# Patient Record
Sex: FEMALE | Race: WHITE | NOT HISPANIC OR LATINO | Employment: FULL TIME | URBAN - METROPOLITAN AREA
[De-identification: names, ages, dates, MRNs, and addresses within clinical notes are randomized per-mention and may not be internally consistent; named-entity substitution may affect disease eponyms.]

---

## 2017-01-30 ENCOUNTER — TELEPHONE (OUTPATIENT)
Dept: OBSTETRICS AND GYNECOLOGY | Facility: CLINIC | Age: 42
End: 2017-01-30

## 2017-01-30 RX ORDER — LEVONORGESTREL AND ETHINYL ESTRADIOL 0.15-0.03
1 KIT ORAL DAILY
Qty: 91 TABLET | Refills: 0 | Status: SHIPPED | OUTPATIENT
Start: 2017-01-30 | End: 2017-02-16 | Stop reason: SDUPTHER

## 2017-01-30 NOTE — TELEPHONE ENCOUNTER
----- Message from Sakina Rushing sent at 1/30/2017  9:55 AM EST -----  Contact: Patient  RF Seasonale.     Patient has AE on 2/7/17

## 2017-01-31 ENCOUNTER — HOSPITAL ENCOUNTER (OUTPATIENT)
Dept: FAMILY MEDICINE CLINIC | Facility: CLINIC | Age: 42
Setting detail: SPECIMEN
Discharge: HOME | End: 2017-01-31
Attending: FAMILY MEDICINE | Admitting: FAMILY MEDICINE

## 2017-01-31 LAB
ANION GAP SERPL CALC-SCNC: 8.6 MMOL/L (ref 10–20)
BASOPHILS # BLD AUTO: 0.1 10*3/UL (ref 0–0.2)
BASOPHILS NFR BLD AUTO: 1 % (ref 0–2)
BUN SERPL-MCNC: 9 MG/DL (ref 8–20)
BUN/CREAT SERPL: 11.3 (ref 5.4–26.2)
CALCIUM SERPL-MCNC: 9.3 MG/DL (ref 8.9–10.3)
CHLORIDE SERPL-SCNC: 106 MMOL/L (ref 101–111)
CONV CO2: 26 MMOL/L (ref 22–32)
CREAT UR-MCNC: 0.8 MG/DL (ref 0.4–1)
DIFFERENTIAL METHOD BLD: (no result)
EOSINOPHIL # BLD AUTO: 0.2 10*3/UL (ref 0–0.3)
EOSINOPHIL # BLD AUTO: 3 % (ref 0–3)
ERYTHROCYTE [DISTWIDTH] IN BLOOD BY AUTOMATED COUNT: 13.2 % (ref 11.5–14.5)
GLUCOSE SERPL-MCNC: 89 MG/DL (ref 65–99)
HCT VFR BLD AUTO: 40.3 % (ref 35–49)
HGB BLD-MCNC: 13.8 G/DL (ref 12–15)
LYMPHOCYTES # BLD AUTO: 2.8 10*3/UL (ref 0.8–4.8)
LYMPHOCYTES NFR BLD AUTO: 35 % (ref 18–42)
MCH RBC QN AUTO: 29.8 PG (ref 26–32)
MCHC RBC AUTO-ENTMCNC: 34.3 G/DL (ref 32–36)
MCV RBC AUTO: 86.8 FL (ref 80–94)
MONOCYTES # BLD AUTO: 0.4 10*3/UL (ref 0.1–1.3)
MONOCYTES NFR BLD AUTO: 6 % (ref 2–11)
NEUTROPHILS # BLD AUTO: 4.5 10*3/UL (ref 2.3–8.6)
NEUTROPHILS NFR BLD AUTO: 55 % (ref 50–75)
NRBC BLD AUTO-RTO: 0 /100{WBCS}
NRBC/RBC NFR BLD MANUAL: 0 10*3/UL
PLATELET # BLD AUTO: 282 10*3/UL (ref 150–450)
PMV BLD AUTO: 8.9 FL (ref 7.4–10.4)
POTASSIUM SERPL-SCNC: 3.6 MMOL/L (ref 3.6–5.1)
RBC # BLD AUTO: 4.64 10*6/UL (ref 4–5.4)
SODIUM SERPL-SCNC: 137 MMOL/L (ref 136–144)
WBC # BLD AUTO: 8 10*3/UL (ref 4.5–11.5)

## 2017-02-16 ENCOUNTER — OFFICE VISIT (OUTPATIENT)
Dept: OBSTETRICS AND GYNECOLOGY | Facility: CLINIC | Age: 42
End: 2017-02-16

## 2017-02-16 VITALS
HEIGHT: 63 IN | DIASTOLIC BLOOD PRESSURE: 58 MMHG | SYSTOLIC BLOOD PRESSURE: 105 MMHG | WEIGHT: 119.6 LBS | BODY MASS INDEX: 21.19 KG/M2 | HEART RATE: 82 BPM

## 2017-02-16 DIAGNOSIS — Z12.4 ROUTINE CERVICAL SMEAR: Primary | ICD-10-CM

## 2017-02-16 DIAGNOSIS — Z30.41 ENCOUNTER FOR SURVEILLANCE OF CONTRACEPTIVE PILLS: ICD-10-CM

## 2017-02-16 DIAGNOSIS — Z01.419 WELL WOMAN EXAM WITH ROUTINE GYNECOLOGICAL EXAM: ICD-10-CM

## 2017-02-16 PROCEDURE — 99395 PREV VISIT EST AGE 18-39: CPT | Performed by: OBSTETRICS & GYNECOLOGY

## 2017-02-16 RX ORDER — DEXTROAMPHETAMINE SACCHARATE, AMPHETAMINE ASPARTATE, DEXTROAMPHETAMINE SULFATE AND AMPHETAMINE SULFATE 5; 5; 5; 5 MG/1; MG/1; MG/1; MG/1
TABLET ORAL
Refills: 0 | COMMUNITY
Start: 2017-02-09 | End: 2019-07-17 | Stop reason: SDUPTHER

## 2017-02-16 RX ORDER — LEVONORGESTREL AND ETHINYL ESTRADIOL 0.15-0.03
1 KIT ORAL DAILY
Qty: 91 TABLET | Refills: 3 | Status: SHIPPED | OUTPATIENT
Start: 2017-02-16 | End: 2017-05-18

## 2017-02-16 NOTE — PROGRESS NOTES
Subjective   Sakina Langley is a 41 y.o. female     CC: Annual and pap    Last annual 2016  Last pap 2013      History of Present Illness: notes no gyn concerns or problems.  Doing well on current OC and would like to continue.    The following portions of the patient's history were reviewed and updated as appropriate: allergies, current medications, past family history, past medical history, past social history, past surgical history and problem list.    Review of Systems   Constitutional: Negative for fatigue.   HENT: Negative for congestion.    Eyes: Negative for visual disturbance.   Respiratory: Negative for chest tightness and shortness of breath.    Cardiovascular: Negative for chest pain and palpitations.   Gastrointestinal: Negative for abdominal pain, blood in stool and nausea.   Endocrine: Negative for cold intolerance.   Genitourinary: Negative for difficulty urinating, dysuria, frequency, genital sores, hematuria, menstrual problem, pelvic pain, urgency, vaginal bleeding, vaginal discharge and vaginal pain.   Musculoskeletal: Negative for back pain.   Skin: Negative for color change and rash.   Neurological: Negative for headaches.   Psychiatric/Behavioral: Negative for sleep disturbance.       History reviewed. No pertinent past medical history.  Past Surgical History   Procedure Laterality Date   •  section     • Tonsillectomy     • Diagnostic laparoscopy     • Breast augmentation     • Mouth surgery       Pallet of mouth     OB History      Para Term  AB TAB SAB Ectopic Multiple Living    1 1        1        Menstrual History:  OB History      Para Term  AB TAB SAB Ectopic Multiple Living    1 1        1         Menarche age: 13  No LMP recorded. Patient is not currently having periods (Reason: Oral contraceptives).       Family History   Problem Relation Age of Onset   • Ovarian cancer Mother      History   Smoking Status   • Never Smoker    Smokeless Tobacco   • Not on file     History   Alcohol Use   • Yes       Objective   Physical Exam   Constitutional: She is oriented to person, place, and time. She appears well-developed and well-nourished.   HENT:   Head: Normocephalic and atraumatic.   Right Ear: External ear normal.   Left Ear: External ear normal.   Nose: Nose normal.   Eyes: EOM are normal. Pupils are equal, round, and reactive to light.   Neck: Normal range of motion. Neck supple. No thyromegaly present.   Cardiovascular: Normal rate, regular rhythm and normal heart sounds.    No murmur heard.  Pulmonary/Chest: Effort normal and breath sounds normal. She has no wheezes. She has no rales. She exhibits no tenderness. Right breast exhibits no inverted nipple, no mass, no nipple discharge, no skin change and no tenderness. Left breast exhibits no inverted nipple, no mass, no nipple discharge, no skin change and no tenderness. There is no breast swelling.   Abdominal: Soft. Bowel sounds are normal. She exhibits no distension and no mass. There is no tenderness. Hernia confirmed negative in the right inguinal area and confirmed negative in the left inguinal area.   Genitourinary: Uterus normal. No breast tenderness. Pelvic exam was performed with patient supine. There is no rash, tenderness or lesion on the right labia. There is no rash, tenderness or lesion on the left labia. Cervix exhibits no motion tenderness, no discharge and no friability. Right adnexum displays no mass and no tenderness. Left adnexum displays no mass and no tenderness. No erythema, tenderness or bleeding in the vagina. No vaginal discharge found.   Musculoskeletal: Normal range of motion. She exhibits no edema.   Neurological: She is alert and oriented to person, place, and time.   Skin: Skin is warm and dry. No rash noted.   Psychiatric: She has a normal mood and affect. Judgment normal.   Nursing note and vitals reviewed.        Assessment/Plan   Sakina was seen today  for annual exam.    Diagnoses and all orders for this visit:    Routine cervical smear  -     IGP, Aptima HPV, Rfx 16 / 18,45    Well woman exam with routine gynecological exam: normal gyn WWE    Encounter for surveillance of contraceptive pills    Other orders  -     levonorgestrel-ethinyl estradiol (SEASONALE) 0.15-0.03 MG per tablet; Take 1 tablet by mouth Daily for 91 days.        Cont. With annual WWE's, and SBE's.  Will see annually or prn.  Begin screening mammography next year.

## 2017-02-20 LAB
CYTOLOGIST CVX/VAG CYTO: NORMAL
CYTOLOGY CVX/VAG DOC THIN PREP: NORMAL
DX ICD CODE: NORMAL
HIV 1 & 2 AB SER-IMP: NORMAL
HPV I/H RISK 4 DNA CVX QL PROBE+SIG AMP: NEGATIVE
OTHER STN SPEC: NORMAL
PATH REPORT.FINAL DX SPEC: NORMAL
STAT OF ADQ CVX/VAG CYTO-IMP: NORMAL

## 2018-05-18 ENCOUNTER — TELEPHONE (OUTPATIENT)
Dept: OBSTETRICS AND GYNECOLOGY | Facility: CLINIC | Age: 43
End: 2018-05-18

## 2018-05-18 RX ORDER — LEVONORGESTREL AND ETHINYL ESTRADIOL 0.15-0.03
1 KIT ORAL DAILY
Qty: 28 TABLET | Refills: 1 | Status: SHIPPED | OUTPATIENT
Start: 2018-05-18 | End: 2018-06-28

## 2018-05-18 NOTE — TELEPHONE ENCOUNTER
----- Message from Sakina Rushing sent at 5/18/2018 11:18 AM EDT -----  Contact: Patient   Patient has an annual scheduled with you on 6/28/18.  Patient is wanting to know if you can refill her Seasonale until she comes in?

## 2018-06-28 ENCOUNTER — OFFICE VISIT (OUTPATIENT)
Dept: OBSTETRICS AND GYNECOLOGY | Facility: CLINIC | Age: 43
End: 2018-06-28

## 2018-06-28 VITALS
HEIGHT: 63 IN | HEART RATE: 84 BPM | WEIGHT: 120 LBS | SYSTOLIC BLOOD PRESSURE: 127 MMHG | DIASTOLIC BLOOD PRESSURE: 69 MMHG | BODY MASS INDEX: 21.26 KG/M2

## 2018-06-28 DIAGNOSIS — Z01.419 WELL WOMAN EXAM WITH ROUTINE GYNECOLOGICAL EXAM: Primary | ICD-10-CM

## 2018-06-28 PROCEDURE — 99396 PREV VISIT EST AGE 40-64: CPT | Performed by: OBSTETRICS & GYNECOLOGY

## 2018-06-28 NOTE — PROGRESS NOTES
Subjective   Sakina Langley is a 42 y.o. female   Chief Complaint   Patient presents with   • Annual Exam     last pap 17  HPV negative      History of Present Illness  Sakina Langley is a 42 y.o.  who presents for an annual examination.  Patient reports a lot of side effects with birth controls that or not Quasense or Seasonale.      Pap history:  Last pap: 2017 NIL, HPV negative  Prior abnormal paps: no  STDs  Sexually active: no  History of STDs: no  Contraception:  OCP    History of physical abuse: no, History of sexual abuse: no and History of verbal/emotional abuse: no    Screening for BRCA-   Is patient's family history significant for any of the following?   Yes, prior genetic testing was negative  For non-[employer]kenazi [employer] women:   Two first-degree relatives with breast cancer, one of whom was diagnosed at age 50 or younger   A combination of three or more first or second-degree relatives with breast cancer regardless of age at diagnosis   A combination of both breast and ovarian cancer among first and second-degree relatives   A first-degree relative with bilateral breast cancer   A combination of two or more first or second degree relatives with ovarian cancer, regardless of age at diagnosis   A first or second-degree relative with both breast and ovarian cancer at any age   History of breast cancer in a male relative   For women of Ashkenazi An[employer] descent:   Any first-degree relative (or two second degree relatives on the same side of the family) with breast or ovarian cancer   Recommendations from the [place] Preventive Services Task Force on who should be offered genetic testing for BRCA mutations*     History reviewed. No pertinent past medical history.  Past Surgical History:   Procedure Laterality Date   • BREAST AUGMENTATION     •  SECTION     • DIAGNOSTIC LAPAROSCOPY     • MOUTH SURGERY      Pallet of mouth   • TONSILLECTOMY       Social History   Substance Use Topics  "  • Smoking status: Never Smoker   • Smokeless tobacco: Never Used   • Alcohol use Yes      Comment: occa     Family History   Problem Relation Age of Onset   • Ovarian cancer Mother    • Breast cancer Neg Hx    • Uterine cancer Neg Hx    • Colon cancer Neg Hx      Current Outpatient Prescriptions on File Prior to Visit   Medication Sig Dispense Refill   • amphetamine-dextroamphetamine (ADDERALL) 20 MG tablet TK 1 TABLET PO BID  0   • [DISCONTINUED] levonorgestrel-ethinyl estradiol (SEASONALE) 0.15-0.03 MG per tablet Take 1 tablet by mouth Daily. 28 tablet 1     No current facility-administered medications on file prior to visit.      No Known Allergies       Review of Systems   Constitutional: Negative for chills, fever and unexpected weight change.   HENT: Negative for congestion, nosebleeds and sore throat.    Eyes: Negative for visual disturbance.   Respiratory: Negative for cough, chest tightness and shortness of breath.    Cardiovascular: Negative for chest pain and palpitations.   Gastrointestinal: Negative for abdominal pain, constipation, diarrhea, nausea and vomiting.   Endocrine: Negative for polyuria.   Genitourinary: Positive for menstrual problem (spotting and blames off brand OCP). Negative for difficulty urinating, dyspareunia, dysuria, frequency, genital sores, hematuria, pelvic pain, urgency, vaginal bleeding, vaginal discharge and vaginal pain.   Musculoskeletal: Negative for arthralgias and joint swelling.   Skin: Negative for color change and rash.   Neurological: Negative for dizziness, light-headedness and headaches.   Hematological: Does not bruise/bleed easily.   Psychiatric/Behavioral: Negative for dysphoric mood. The patient is not nervous/anxious.        Objective    /69   Pulse 84   Ht 160 cm (62.99\")   Wt 54.4 kg (120 lb)   BMI 21.26 kg/m²    Physical Exam   Constitutional: She is oriented to person, place, and time. She appears well-developed and well-nourished. No distress. "   HENT:   Head: Normocephalic and atraumatic.   Neck: No tracheal deviation present. No thyromegaly present.   Cardiovascular: Normal rate, regular rhythm and normal heart sounds.  Exam reveals no gallop and no friction rub.    No murmur heard.  Pulmonary/Chest: Effort normal and breath sounds normal. No respiratory distress. She has no wheezes. She has no rales. She exhibits no tenderness. Right breast exhibits no inverted nipple, no mass, no nipple discharge, no skin change and no tenderness. Left breast exhibits no inverted nipple, no mass, no nipple discharge, no skin change and no tenderness.   Abdominal: Soft. She exhibits no distension and no mass. There is no tenderness.   Genitourinary: Uterus normal. No labial fusion. There is no rash, lesion or injury on the right labia. There is no rash, lesion or injury on the left labia. Uterus is not deviated, not enlarged, not fixed and not tender. Cervix exhibits no motion tenderness, no discharge and no friability. Right adnexum displays no mass, no tenderness and no fullness. Left adnexum displays no mass, no tenderness and no fullness. No tenderness or bleeding in the vagina. No vaginal discharge found.   Musculoskeletal: Normal range of motion. She exhibits no edema or deformity.   Lymphadenopathy:     She has no cervical adenopathy.   Neurological: She is alert and oriented to person, place, and time.   Skin: Skin is warm and dry. No rash noted. She is not diaphoretic.   Psychiatric: She has a normal mood and affect. Her behavior is normal. Judgment and thought content normal.         Assessment/Plan   Problems Addressed this Visit     None      Visit Diagnoses     Well woman exam with routine gynecological exam    -  Primary        Well woman counseling/screening:    Cervical cancer screening:    Reports no h/o cervical dysplasia   The patient is due for a pap in 2022.    Screening guidelines discussed with patient  Breast cancer screening:    Clinical breast  exam recommended for age 20-39 years every 1-3 years   Mammogram recommended starting age 40, Discussed risks and benefits of earlier versus later screening. Patient plans to schedule   Breast self awareness encouraged  STD Screening   Declines  Contraception :   Reviewed that Seasonale was sent. Will call pharmacy and request only Quasense or brand seasonale  Family history   Has had negative genetic testing given maternal h/o ovarian cancer in mom and maternal aunts  Healthy lifestyle counseling:   Patient was counseled on    Body mass index is 21.26 kg/m².

## 2018-07-02 ENCOUNTER — TELEPHONE (OUTPATIENT)
Dept: OBSTETRICS AND GYNECOLOGY | Facility: CLINIC | Age: 43
End: 2018-07-02

## 2018-07-02 RX ORDER — LEVONORGESTREL AND ETHINYL ESTRADIOL 0.15-0.03
1 KIT ORAL DAILY
Qty: 84 TABLET | Refills: 3 | Status: SHIPPED | OUTPATIENT
Start: 2018-07-02 | End: 2019-07-02

## 2018-07-02 NOTE — TELEPHONE ENCOUNTER
Per patient's original discussion, she only will accept Quasense or name brand Seasonale.  I had originally ordered Seasonale and they had dispensed a different generic.  I then ordered Quasense.  She is now requesting Seasonale which I have ordered as SAMAN.  I have called the pharmacy to confirm.  They are working on this matter as 's have changed. Please call patient and update.

## 2018-07-02 NOTE — TELEPHONE ENCOUNTER
Pt states that her insurance will only cover her prescription if it is called or prescribed as Seasonale and not the off brand. Please advise.

## 2018-07-05 NOTE — TELEPHONE ENCOUNTER
7/5/18  Called patient to update regarding the Seasonale Dr. Chahal had call pharmacy to confirm that she had ordered Seaonale. No asnwer, left message to return call.       07/13/18  Called patient to update regarding the Seasonale Dr. Chahal had call pharmacy to confirm that she had ordered Seasonale. No asnwer, left message to return call.

## 2019-02-08 ENCOUNTER — HOSPITAL ENCOUNTER (OUTPATIENT)
Dept: FAMILY MEDICINE CLINIC | Facility: CLINIC | Age: 44
Setting detail: SPECIMEN
Discharge: HOME | End: 2019-02-08
Attending: FAMILY MEDICINE | Admitting: FAMILY MEDICINE

## 2019-02-08 LAB
ALBUMIN SERPL-MCNC: 3.7 G/DL (ref 3.5–4.8)
ALBUMIN/GLOB SERPL: 1.4 {RATIO} (ref 1–1.7)
ALP SERPL-CCNC: 45 IU/L (ref 32–91)
ALT SERPL-CCNC: 16 IU/L (ref 14–54)
AMPHETAMINES UR QL SCN: POSITIVE
ANION GAP SERPL CALC-SCNC: 12.4 MMOL/L (ref 10–20)
AST SERPL-CCNC: 20 IU/L (ref 15–41)
BARBITURATES UR QL SCN: NEGATIVE
BASOPHILS # BLD AUTO: 0.1 10*3/UL (ref 0–0.2)
BASOPHILS NFR BLD AUTO: 1 % (ref 0–2)
BENZODIAZ UR QL SCN: NEGATIVE
BILIRUB SERPL-MCNC: 0.4 MG/DL (ref 0.3–1.2)
BUN SERPL-MCNC: 11 MG/DL (ref 8–20)
BUN/CREAT SERPL: 13.8 (ref 5.4–26.2)
BZE UR QL SCN: NEGATIVE
CALCIUM SERPL-MCNC: 9.2 MG/DL (ref 8.9–10.3)
CHLORIDE SERPL-SCNC: 104 MMOL/L (ref 101–111)
CHOLEST SERPL-MCNC: 167 MG/DL
CHOLEST/HDLC SERPL: 2.9 {RATIO}
CONV CO2: 24 MMOL/L (ref 22–32)
CONV LDL CHOLESTEROL DIRECT: 100 MG/DL (ref 0–100)
CONV TOTAL PROTEIN: 6.3 G/DL (ref 6.1–7.9)
CREAT 24H UR-MCNC: ABNORMAL MG/DL
CREAT UR-MCNC: 0.8 MG/DL (ref 0.4–1)
DIFFERENTIAL METHOD BLD: (no result)
EOSINOPHIL # BLD AUTO: 0.1 10*3/UL (ref 0–0.3)
EOSINOPHIL # BLD AUTO: 1 % (ref 0–3)
ERYTHROCYTE [DISTWIDTH] IN BLOOD BY AUTOMATED COUNT: 13.9 % (ref 11.5–14.5)
GLOBULIN UR ELPH-MCNC: 2.6 G/DL (ref 2.5–3.8)
GLUCOSE SERPL-MCNC: 88 MG/DL (ref 65–99)
HCT VFR BLD AUTO: 41 % (ref 35–49)
HDLC SERPL-MCNC: 57 MG/DL
HGB BLD-MCNC: 13.7 G/DL (ref 12–15)
LDLC/HDLC SERPL: 1.8 {RATIO}
LIPID INTERPRETATION: NORMAL
LYMPHOCYTES # BLD AUTO: 1.9 10*3/UL (ref 0.8–4.8)
LYMPHOCYTES NFR BLD AUTO: 22 % (ref 18–42)
MCH RBC QN AUTO: 29.8 PG (ref 26–32)
MCHC RBC AUTO-ENTMCNC: 33.4 G/DL (ref 32–36)
MCV RBC AUTO: 89 FL (ref 80–94)
METHADONE UR QL SCN: NEGATIVE
MONOCYTES # BLD AUTO: 0.4 10*3/UL (ref 0.1–1.3)
MONOCYTES NFR BLD AUTO: 5 % (ref 2–11)
NEUTROPHILS # BLD AUTO: 6.3 10*3/UL (ref 2.3–8.6)
NEUTROPHILS NFR BLD AUTO: 71 % (ref 50–75)
NRBC BLD AUTO-RTO: 0 /100{WBCS}
NRBC/RBC NFR BLD MANUAL: 0 10*3/UL
OPIATES TESTED UR SCN: NEGATIVE
PCP UR QL: NEGATIVE
PLATELET # BLD AUTO: 292 10*3/UL (ref 150–450)
PMV BLD AUTO: 8.9 FL (ref 7.4–10.4)
POTASSIUM SERPL-SCNC: 3.4 MMOL/L (ref 3.6–5.1)
RBC # BLD AUTO: 4.61 10*6/UL (ref 4–5.4)
SODIUM SERPL-SCNC: 137 MMOL/L (ref 136–144)
THC SERPLBLD CFM-MCNC: NEGATIVE NG/ML
TRIGL SERPL-MCNC: 33 MG/DL
VLDLC SERPL CALC-MCNC: 9.6 MG/DL
WBC # BLD AUTO: 8.8 10*3/UL (ref 4.5–11.5)

## 2019-06-03 ENCOUNTER — CONVERSION ENCOUNTER (OUTPATIENT)
Dept: FAMILY MEDICINE CLINIC | Facility: CLINIC | Age: 44
End: 2019-06-03

## 2019-06-04 VITALS
BODY MASS INDEX: 20.49 KG/M2 | DIASTOLIC BLOOD PRESSURE: 79 MMHG | HEART RATE: 85 BPM | OXYGEN SATURATION: 98 % | SYSTOLIC BLOOD PRESSURE: 126 MMHG | HEIGHT: 64 IN | WEIGHT: 120 LBS

## 2019-06-06 NOTE — PROGRESS NOTES
Visit Type:  Follow-up Visit  Referring Provider:  Nehemias Pickens MD  Primary Provider:  Nehemias Pickens    CC:  f/u on ADHD.    History of Present Illness:         This is a 43 years old female who presents  for 3 months  f/u on ADHD and med refill.  She is doing well, taking Adderall - helping.  She  denies inattention, difficulity focusing,  anxiety,  depression and sleep disturbance.             Risk Factors:     Smoked Tobacco Use:  Never smoker      Review of Systems     General       Denies sleep disorder.    Psych       Denies anxiety, depression and inability to concentrate.      Vital Signs:    Patient Profile:    43 Years Old Female  Height:     64 inches  Weight:     120 pounds  BMI:        20.60     O2 Sat:     98 %  Temp:       98.2 degrees F oral  Pulse rate: 85 / minute  BP Sittin / 79  (right arm)    Cuff size:  regular      Problems: Active problems were reviewed with the patient during this visit.  Medications: Medications were reviewed with the patient during this visit.  Allergies: Allergies were reviewed with the patient during this visit.        Vitals Entered By: Shiela CADE (Barbra  3, 2019 2:33 PM)      Physical Exam    General:      well developed, well nourished, in no acute distress.    Neurologic:      no focal deficits, cranial nerves II-XII grossly intact with normal sensation, reflexes, coordination, muscle strength and tone.    Psych:      alert and cooperative; normal mood and affect; normal attention span and concentration.        Blood Pressure:  Today's BP: 126/79 mm Hg    Labwork:   Most Recent Lab Results:   LDL: 100 mg/dL 2019    Active Medications (reviewed today):  KLOR-CON 10 10 MEQ ORAL TABLET EXTENDED RELEASE (POTASSIUM CHLORIDE) Take 1 tablet by mouth daily for 1 week.  SEASONALE TABS (LEVONORGEST-ETH ESTRAD 91-DAY TABS) Take 1 tablet by mouth daily  ADDERALL 20 MG ORAL TABLET (AMPHETAMINE-DEXTROAMPHETAMINE) 1 po BID    Current Allergies (reviewed  today):  No known allergies        Impression & Recommendations:    Problem # 1:  ADHD (ICD-314.01) (WIK10-P17.9)  Assessment: Improved  Stable- refill  Adderall 20 mg bid.             Patient Instructions:  1)  Please schedule a follow-up appointment in 3 months.          Medications:  ADDERALL 20 MG ORAL TABLET (AMPHETAMINE-DEXTROAMPHETAMINE) 1 po BID  #60 x 0      Entered and Authorized by:  Nehemias Pickens MD      Electronically signed by:   Nehemias Pickens MD on 06/03/2019      Method used:    Handwritten      RxID:   [id number]                Medication Administration    Orders Added:  1)  Ofc Vst, Est Level II [[id number]]  ]      Electronically signed by Nehemias Pickens MD on 06/04/2019 at 8:18 AM  ________________________________________________________________________       Disclaimer: Converted Note message may not contain all data elements that existed in the legacy source system. Please see [facility] [facility] for the original note details.

## 2019-07-16 ENCOUNTER — TELEPHONE (OUTPATIENT)
Dept: FAMILY MEDICINE CLINIC | Facility: CLINIC | Age: 44
End: 2019-07-16

## 2019-07-17 RX ORDER — DEXTROAMPHETAMINE SACCHARATE, AMPHETAMINE ASPARTATE, DEXTROAMPHETAMINE SULFATE AND AMPHETAMINE SULFATE 5; 5; 5; 5 MG/1; MG/1; MG/1; MG/1
20 TABLET ORAL 2 TIMES DAILY
Qty: 60 TABLET | Refills: 0 | Status: SHIPPED | OUTPATIENT
Start: 2019-07-17 | End: 2019-08-20 | Stop reason: SDUPTHER

## 2019-08-20 RX ORDER — DEXTROAMPHETAMINE SACCHARATE, AMPHETAMINE ASPARTATE, DEXTROAMPHETAMINE SULFATE AND AMPHETAMINE SULFATE 5; 5; 5; 5 MG/1; MG/1; MG/1; MG/1
20 TABLET ORAL 2 TIMES DAILY
Qty: 60 TABLET | Refills: 0 | Status: SHIPPED | OUTPATIENT
Start: 2019-08-20 | End: 2019-09-26 | Stop reason: SDUPTHER

## 2019-09-26 ENCOUNTER — OFFICE VISIT (OUTPATIENT)
Dept: FAMILY MEDICINE CLINIC | Facility: CLINIC | Age: 44
End: 2019-09-26

## 2019-09-26 VITALS
TEMPERATURE: 97.7 F | BODY MASS INDEX: 21.62 KG/M2 | WEIGHT: 122 LBS | OXYGEN SATURATION: 100 % | SYSTOLIC BLOOD PRESSURE: 121 MMHG | HEART RATE: 90 BPM | RESPIRATION RATE: 16 BRPM | DIASTOLIC BLOOD PRESSURE: 78 MMHG | HEIGHT: 63 IN

## 2019-09-26 DIAGNOSIS — F41.9 ANXIETY: ICD-10-CM

## 2019-09-26 DIAGNOSIS — F90.9 ATTENTION DEFICIT HYPERACTIVITY DISORDER (ADHD), UNSPECIFIED ADHD TYPE: Primary | ICD-10-CM

## 2019-09-26 PROCEDURE — 99213 OFFICE O/P EST LOW 20 MIN: CPT | Performed by: FAMILY MEDICINE

## 2019-09-26 RX ORDER — DEXTROAMPHETAMINE SACCHARATE, AMPHETAMINE ASPARTATE, DEXTROAMPHETAMINE SULFATE AND AMPHETAMINE SULFATE 5; 5; 5; 5 MG/1; MG/1; MG/1; MG/1
20 TABLET ORAL 2 TIMES DAILY
Qty: 60 TABLET | Refills: 0 | Status: SHIPPED | OUTPATIENT
Start: 2019-09-26 | End: 2019-12-04 | Stop reason: SDUPTHER

## 2019-09-26 NOTE — ASSESSMENT & PLAN NOTE
Psychological condition is improving with treatment.  Continue current treatment regimen.  Regular aerobic exercise.  Psychological condition  will be reassessed in 3 months.

## 2019-09-26 NOTE — PROGRESS NOTES
Subjective   Sakina Langley is a 44 y.o. female.     History of Present Illness      This is a 44 years old female who presents  for 3 months  f/u on ADHD and med refill.  She is doing well, complain of anxiety and sleep disturbance for last 2 to 3 weeks since daughter has left home for college. She  denies inattention, difficulity focusing,  depression and suicidal thoughts.  She is taking Adderall which is helping.        The following portions of the patient's history were reviewed and updated as appropriate: past medical history, past social history, past surgical history and problem list.    Review of Systems   Constitutional: Negative for activity change, appetite change and fatigue.   HENT: Negative for congestion and trouble swallowing.    Respiratory: Negative for shortness of breath and wheezing.    Cardiovascular: Negative for chest pain and palpitations.   Neurological: Negative for dizziness and headache.   Psychiatric/Behavioral: Positive for sleep disturbance. Negative for suicidal ideas and depressed mood. The patient is nervous/anxious.        Objective   Physical Exam   Constitutional: She is oriented to person, place, and time. She appears well-developed.   HENT:   Head: Normocephalic.   Eyes: EOM are normal. Pupils are equal, round, and reactive to light.   Cardiovascular: Normal rate and normal heart sounds.   Pulmonary/Chest: Effort normal and breath sounds normal. She has no wheezes.   Abdominal: Soft. Bowel sounds are normal.   Musculoskeletal: Normal range of motion.   Neurological: She is alert and oriented to person, place, and time.   Psychiatric: She has a normal mood and affect. Her behavior is normal.   Vitals reviewed.        Assessment/Plan   Problems Addressed this Visit        Other    Attention deficit hyperactivity disorder (ADHD) - Primary     Psychological condition is improving with treatment.  Continue current treatment regimen.  Regular aerobic exercise.  Psychological  condition  will be reassessed in 3 months.         Relevant Medications    amphetamine-dextroamphetamine (ADDERALL) 20 MG tablet    Anxiety     New problem -discussed relaxation technique and behavioral modification.  Take over-the-counter melatonin for sleep disturbance.  Call us if symptoms no better or get worse.  The patient verified not suicidal at this time, she will call our office if there is  any worsening of Sx or thoughts of doing  harm arise.

## 2019-09-26 NOTE — ASSESSMENT & PLAN NOTE
New problem -discussed relaxation technique and behavioral modification.  Take over-the-counter melatonin for sleep disturbance.  Call us if symptoms no better or get worse.  The patient verified not suicidal at this time, she will call our office if there is  any worsening of Sx or thoughts of doing  harm arise.

## 2019-10-28 RX ORDER — DEXTROAMPHETAMINE SACCHARATE, AMPHETAMINE ASPARTATE, DEXTROAMPHETAMINE SULFATE AND AMPHETAMINE SULFATE 5; 5; 5; 5 MG/1; MG/1; MG/1; MG/1
TABLET ORAL EVERY 12 HOURS
COMMUNITY
Start: 2012-09-07 | End: 2019-10-28 | Stop reason: SDUPTHER

## 2019-10-28 RX ORDER — DEXTROAMPHETAMINE SACCHARATE, AMPHETAMINE ASPARTATE, DEXTROAMPHETAMINE SULFATE AND AMPHETAMINE SULFATE 5; 5; 5; 5 MG/1; MG/1; MG/1; MG/1
20 TABLET ORAL 2 TIMES DAILY
Qty: 60 TABLET | Refills: 0 | Status: SHIPPED | OUTPATIENT
Start: 2019-10-28 | End: 2019-12-04 | Stop reason: SDUPTHER

## 2019-12-04 ENCOUNTER — TELEPHONE (OUTPATIENT)
Dept: FAMILY MEDICINE CLINIC | Facility: CLINIC | Age: 44
End: 2019-12-04

## 2019-12-04 RX ORDER — DEXTROAMPHETAMINE SACCHARATE, AMPHETAMINE ASPARTATE, DEXTROAMPHETAMINE SULFATE AND AMPHETAMINE SULFATE 5; 5; 5; 5 MG/1; MG/1; MG/1; MG/1
20 TABLET ORAL 2 TIMES DAILY
Qty: 60 TABLET | Refills: 0 | Status: SHIPPED | OUTPATIENT
Start: 2019-12-04 | End: 2020-01-03 | Stop reason: SDUPTHER

## 2020-01-03 ENCOUNTER — OFFICE VISIT (OUTPATIENT)
Dept: FAMILY MEDICINE CLINIC | Facility: CLINIC | Age: 45
End: 2020-01-03

## 2020-01-03 VITALS
SYSTOLIC BLOOD PRESSURE: 110 MMHG | TEMPERATURE: 98 F | HEIGHT: 63 IN | OXYGEN SATURATION: 100 % | BODY MASS INDEX: 21.44 KG/M2 | WEIGHT: 121 LBS | HEART RATE: 72 BPM | DIASTOLIC BLOOD PRESSURE: 70 MMHG

## 2020-01-03 DIAGNOSIS — F41.9 ANXIETY: ICD-10-CM

## 2020-01-03 DIAGNOSIS — F90.9 ATTENTION DEFICIT HYPERACTIVITY DISORDER (ADHD), UNSPECIFIED ADHD TYPE: Primary | ICD-10-CM

## 2020-01-03 PROCEDURE — 99213 OFFICE O/P EST LOW 20 MIN: CPT | Performed by: FAMILY MEDICINE

## 2020-01-03 RX ORDER — DEXTROAMPHETAMINE SACCHARATE, AMPHETAMINE ASPARTATE, DEXTROAMPHETAMINE SULFATE AND AMPHETAMINE SULFATE 5; 5; 5; 5 MG/1; MG/1; MG/1; MG/1
20 TABLET ORAL 2 TIMES DAILY
Qty: 60 TABLET | Refills: 0 | Status: SHIPPED | OUTPATIENT
Start: 2020-01-03 | End: 2020-02-12 | Stop reason: SDUPTHER

## 2020-01-03 NOTE — PROGRESS NOTES
Subjective   Sakina Langley is a 44 y.o. female.     History of Present Illness     The patient present  for 3 months f/u on ADD, anxiety  and med refill. She is doing well, tolerating adderall well , denies difficulity concentration, focusing, anxiety, depression, and  insomnia.        The following portions of the patient's history were reviewed and updated as appropriate: past medical history, past social history, past surgical history and problem list.    Review of Systems   Constitutional: Negative for fatigue.   Cardiovascular: Negative for chest pain and palpitations.   Gastrointestinal: Negative for abdominal pain.   Endocrine: Negative for cold intolerance.   Neurological: Negative for dizziness and headache.   Psychiatric/Behavioral: Negative for sleep disturbance, suicidal ideas and depressed mood. The patient is not nervous/anxious.        Objective   Physical Exam   Constitutional: She is oriented to person, place, and time. She appears well-developed.   Cardiovascular: Normal rate, regular rhythm and normal heart sounds.   Pulmonary/Chest: Effort normal and breath sounds normal. She has no wheezes.   Abdominal: Soft. Bowel sounds are normal.   Musculoskeletal: Normal range of motion.   Neurological: She is alert and oriented to person, place, and time.   Psychiatric: She has a normal mood and affect. Her behavior is normal.   Vitals reviewed.        Assessment/Plan   Problems Addressed this Visit        Other    Attention deficit hyperactivity disorder (ADHD) - Primary     Psychological condition is improving with treatment.  Continue current treatment regimen.  Regular aerobic exercise.  Psychological condition  will be reassessed in 3 months.         Relevant Medications    amphetamine-dextroamphetamine (ADDERALL) 20 MG tablet    Anxiety     Anxiety is improving.

## 2020-01-14 ENCOUNTER — TELEPHONE (OUTPATIENT)
Dept: OBSTETRICS AND GYNECOLOGY | Facility: CLINIC | Age: 45
End: 2020-01-14

## 2020-01-14 NOTE — TELEPHONE ENCOUNTER
Patient is needing levonorgestrel-ethinyl estradiol (SEASONALE) 0.15-0.03 MG per tablet called in, states she needs two months called in to get her to her annual appointment scheduled in May.

## 2020-02-12 DIAGNOSIS — F90.9 ATTENTION DEFICIT HYPERACTIVITY DISORDER (ADHD), UNSPECIFIED ADHD TYPE: ICD-10-CM

## 2020-02-12 RX ORDER — DEXTROAMPHETAMINE SACCHARATE, AMPHETAMINE ASPARTATE, DEXTROAMPHETAMINE SULFATE AND AMPHETAMINE SULFATE 5; 5; 5; 5 MG/1; MG/1; MG/1; MG/1
20 TABLET ORAL 2 TIMES DAILY
Qty: 60 TABLET | Refills: 0 | Status: SHIPPED | OUTPATIENT
Start: 2020-02-12 | End: 2020-04-14 | Stop reason: SDUPTHER

## 2020-02-27 ENCOUNTER — OFFICE VISIT (OUTPATIENT)
Dept: FAMILY MEDICINE CLINIC | Facility: CLINIC | Age: 45
End: 2020-02-27

## 2020-02-27 ENCOUNTER — LAB (OUTPATIENT)
Dept: FAMILY MEDICINE CLINIC | Facility: CLINIC | Age: 45
End: 2020-02-27

## 2020-02-27 VITALS
SYSTOLIC BLOOD PRESSURE: 118 MMHG | OXYGEN SATURATION: 100 % | WEIGHT: 121 LBS | TEMPERATURE: 98.2 F | BODY MASS INDEX: 21.44 KG/M2 | DIASTOLIC BLOOD PRESSURE: 73 MMHG | HEART RATE: 90 BPM

## 2020-02-27 DIAGNOSIS — R10.13 EPIGASTRIC PAIN: ICD-10-CM

## 2020-02-27 DIAGNOSIS — R10.13 EPIGASTRIC PAIN: Primary | ICD-10-CM

## 2020-02-27 DIAGNOSIS — R10.11 RUQ ABDOMINAL PAIN: ICD-10-CM

## 2020-02-27 LAB
ALBUMIN SERPL-MCNC: 4.5 G/DL (ref 3.5–5.2)
ALBUMIN/GLOB SERPL: 2 G/DL
ALP SERPL-CCNC: 44 U/L (ref 39–117)
ALT SERPL W P-5'-P-CCNC: 9 U/L (ref 1–33)
ANION GAP SERPL CALCULATED.3IONS-SCNC: 10.5 MMOL/L (ref 5–15)
AST SERPL-CCNC: 15 U/L (ref 1–32)
BASOPHILS # BLD AUTO: 0.05 10*3/MM3 (ref 0–0.2)
BASOPHILS NFR BLD AUTO: 0.6 % (ref 0–1.5)
BILIRUB SERPL-MCNC: 0.2 MG/DL (ref 0.2–1.2)
BUN BLD-MCNC: 10 MG/DL (ref 6–20)
BUN/CREAT SERPL: 11.8 (ref 7–25)
CALCIUM SPEC-SCNC: 9.4 MG/DL (ref 8.6–10.5)
CHLORIDE SERPL-SCNC: 103 MMOL/L (ref 98–107)
CO2 SERPL-SCNC: 24.5 MMOL/L (ref 22–29)
CREAT BLD-MCNC: 0.85 MG/DL (ref 0.57–1)
DEPRECATED RDW RBC AUTO: 39.2 FL (ref 37–54)
EOSINOPHIL # BLD AUTO: 0.25 10*3/MM3 (ref 0–0.4)
EOSINOPHIL NFR BLD AUTO: 3 % (ref 0.3–6.2)
ERYTHROCYTE [DISTWIDTH] IN BLOOD BY AUTOMATED COUNT: 12.4 % (ref 12.3–15.4)
GFR SERPL CREATININE-BSD FRML MDRD: 73 ML/MIN/1.73
GLOBULIN UR ELPH-MCNC: 2.3 GM/DL
GLUCOSE BLD-MCNC: 96 MG/DL (ref 65–99)
HCT VFR BLD AUTO: 39.1 % (ref 34–46.6)
HGB BLD-MCNC: 13.5 G/DL (ref 12–15.9)
IMM GRANULOCYTES # BLD AUTO: 0.02 10*3/MM3 (ref 0–0.05)
IMM GRANULOCYTES NFR BLD AUTO: 0.2 % (ref 0–0.5)
LYMPHOCYTES # BLD AUTO: 3.04 10*3/MM3 (ref 0.7–3.1)
LYMPHOCYTES NFR BLD AUTO: 37 % (ref 19.6–45.3)
MCH RBC QN AUTO: 30.2 PG (ref 26.6–33)
MCHC RBC AUTO-ENTMCNC: 34.5 G/DL (ref 31.5–35.7)
MCV RBC AUTO: 87.5 FL (ref 79–97)
MONOCYTES # BLD AUTO: 0.62 10*3/MM3 (ref 0.1–0.9)
MONOCYTES NFR BLD AUTO: 7.6 % (ref 5–12)
NEUTROPHILS # BLD AUTO: 4.23 10*3/MM3 (ref 1.7–7)
NEUTROPHILS NFR BLD AUTO: 51.6 % (ref 42.7–76)
NRBC BLD AUTO-RTO: 0 /100 WBC (ref 0–0.2)
PLATELET # BLD AUTO: 312 10*3/MM3 (ref 140–450)
PMV BLD AUTO: 10.1 FL (ref 6–12)
POTASSIUM BLD-SCNC: 3.8 MMOL/L (ref 3.5–5.2)
PROT SERPL-MCNC: 6.8 G/DL (ref 6–8.5)
RBC # BLD AUTO: 4.47 10*6/MM3 (ref 3.77–5.28)
SODIUM BLD-SCNC: 138 MMOL/L (ref 136–145)
WBC NRBC COR # BLD: 8.21 10*3/MM3 (ref 3.4–10.8)

## 2020-02-27 PROCEDURE — 85025 COMPLETE CBC W/AUTO DIFF WBC: CPT | Performed by: FAMILY MEDICINE

## 2020-02-27 PROCEDURE — 36415 COLL VENOUS BLD VENIPUNCTURE: CPT

## 2020-02-27 PROCEDURE — 80053 COMPREHEN METABOLIC PANEL: CPT | Performed by: FAMILY MEDICINE

## 2020-02-27 PROCEDURE — 99214 OFFICE O/P EST MOD 30 MIN: CPT | Performed by: FAMILY MEDICINE

## 2020-02-27 RX ORDER — NICOTINE POLACRILEX 4 MG/1
20 GUM, CHEWING ORAL DAILY
Qty: 30 EACH | Refills: 3 | Status: SHIPPED | OUTPATIENT
Start: 2020-02-27 | End: 2020-09-16

## 2020-02-27 NOTE — PROGRESS NOTES
Subjective   Sakina Langley is a 44 y.o. female.     Abdominal Pain   This is a new problem. The current episode started 1 to 4 weeks ago. The onset quality is gradual. The problem occurs daily. The problem has been gradually worsening. The pain is located in the epigastric region and RUQ. The pain is at a severity of 9/10. The pain is moderate. The quality of the pain is dull and sharp. The abdominal pain does not radiate. Associated symptoms include diarrhea, flatus and nausea. Pertinent negatives include no constipation, dysuria, fever, frequency, headaches, hematuria, melena, vomiting or weight loss. The pain is aggravated by eating. She has tried nothing for the symptoms.        The following portions of the patient's history were reviewed and updated as appropriate: past medical history, past social history, past surgical history and problem list.    Review of Systems   Constitutional: Positive for fatigue. Negative for fever and unexpected weight loss.   HENT: Negative for trouble swallowing.    Eyes: Negative for visual disturbance.   Gastrointestinal: Positive for abdominal distention, abdominal pain, diarrhea, flatus, nausea and indigestion. Negative for blood in stool, constipation, melena and vomiting.   Genitourinary: Negative for dysuria, frequency, hematuria, pelvic pain and urgency.   Musculoskeletal: Negative for back pain.   Neurological: Negative for dizziness and headache.       Objective   Physical Exam   Constitutional: She is oriented to person, place, and time. She appears well-developed.   HENT:   Mouth/Throat: Oropharynx is clear and moist.   Eyes: Pupils are equal, round, and reactive to light. EOM are normal.   Neck: Normal range of motion. Neck supple.   Cardiovascular: Normal rate, regular rhythm and normal heart sounds.   Pulmonary/Chest: Effort normal and breath sounds normal. She has no wheezes.   Abdominal: Soft. Bowel sounds are normal. There is tenderness. There is no rebound and  no guarding.   +  epigastric and right upper quadrant tenderness   Musculoskeletal: Normal range of motion.   Neurological: She is alert and oriented to person, place, and time.   Psychiatric: She has a normal mood and affect.   Vitals reviewed.        Assessment/Plan   Problems Addressed this Visit        Nervous and Auditory    Epigastric pain - Primary     New problem- Discussed symptom management and fluids.  Rx omeprazole.  We will check CBC and CMP               Relevant Orders    Comprehensive metabolic panel (Completed)    CBC w AUTO Differential (Completed)    RUQ abdominal pain     We will schedule for gallbladder ultrasound.         Relevant Orders    US Gallbladder

## 2020-02-28 NOTE — ASSESSMENT & PLAN NOTE
New problem- Discussed symptom management and fluids.  Rx omeprazole.  We will check CBC and CMP

## 2020-03-05 ENCOUNTER — HOSPITAL ENCOUNTER (OUTPATIENT)
Dept: ULTRASOUND IMAGING | Facility: HOSPITAL | Age: 45
Discharge: HOME | End: 2020-03-05
Admitting: FAMILY MEDICINE

## 2020-03-05 DIAGNOSIS — R10.11 RUQ ABDOMINAL PAIN: ICD-10-CM

## 2020-03-05 PROCEDURE — 76705 ECHO EXAM OF ABDOMEN: CPT

## 2020-03-06 ENCOUNTER — TELEPHONE (OUTPATIENT)
Dept: FAMILY MEDICINE CLINIC | Facility: CLINIC | Age: 45
End: 2020-03-06

## 2020-03-06 DIAGNOSIS — K80.20 GALL STONES: Primary | ICD-10-CM

## 2020-03-11 ENCOUNTER — APPOINTMENT (OUTPATIENT)
Dept: PREADMISSION TESTING | Facility: HOSPITAL | Age: 45
End: 2020-03-11

## 2020-03-11 ENCOUNTER — OFFICE VISIT (OUTPATIENT)
Dept: SURGERY | Facility: CLINIC | Age: 45
End: 2020-03-11

## 2020-03-11 ENCOUNTER — PREP FOR SURGERY (OUTPATIENT)
Dept: OTHER | Facility: HOSPITAL | Age: 45
End: 2020-03-11

## 2020-03-11 VITALS
OXYGEN SATURATION: 100 % | HEART RATE: 84 BPM | SYSTOLIC BLOOD PRESSURE: 115 MMHG | HEIGHT: 63 IN | BODY MASS INDEX: 21.05 KG/M2 | DIASTOLIC BLOOD PRESSURE: 73 MMHG | WEIGHT: 118.8 LBS

## 2020-03-11 VITALS
OXYGEN SATURATION: 100 % | HEART RATE: 64 BPM | BODY MASS INDEX: 21.09 KG/M2 | SYSTOLIC BLOOD PRESSURE: 125 MMHG | WEIGHT: 119 LBS | HEIGHT: 63 IN | RESPIRATION RATE: 14 BRPM | DIASTOLIC BLOOD PRESSURE: 74 MMHG

## 2020-03-11 DIAGNOSIS — K81.9 CHOLECYSTITIS: Primary | ICD-10-CM

## 2020-03-11 LAB
DEPRECATED RDW RBC AUTO: 40.7 FL (ref 37–54)
ERYTHROCYTE [DISTWIDTH] IN BLOOD BY AUTOMATED COUNT: 13.2 % (ref 12.3–15.4)
HCT VFR BLD AUTO: 39.9 % (ref 34–46.6)
HGB BLD-MCNC: 13.6 G/DL (ref 12–15.9)
MCH RBC QN AUTO: 29.9 PG (ref 26.6–33)
MCHC RBC AUTO-ENTMCNC: 34.1 G/DL (ref 31.5–35.7)
MCV RBC AUTO: 87.8 FL (ref 79–97)
PLATELET # BLD AUTO: 296 10*3/MM3 (ref 140–450)
PMV BLD AUTO: 7.9 FL (ref 6–12)
RBC # BLD AUTO: 4.54 10*6/MM3 (ref 3.77–5.28)
WBC NRBC COR # BLD: 11.2 10*3/MM3 (ref 3.4–10.8)

## 2020-03-11 PROCEDURE — 85027 COMPLETE CBC AUTOMATED: CPT | Performed by: SURGERY

## 2020-03-11 PROCEDURE — 99203 OFFICE O/P NEW LOW 30 MIN: CPT | Performed by: SURGERY

## 2020-03-11 PROCEDURE — 36415 COLL VENOUS BLD VENIPUNCTURE: CPT

## 2020-03-11 NOTE — H&P (VIEW-ONLY)
Subjective   Sakina Langley is a 44 y.o. female.   No chief complaint on file.    There were no vitals taken for this visit.    HISTORY OF PRESENT ILLNESS:  Patient is a pleasant 44-year-old female presenting for evaluation of cholelithiasis.  She complains of a 2-month history of epigastric pain, diarrhea, bloating.  Her symptoms are worse after eating and have been gradually worsening over time.  She classifies the most severe at this time.  She has been modifying her diet and is eating only yogurt and applesauce at this time.    Answers for HPI/ROS submitted by the patient on 3/11/2020   Abdominal pain  What is the primary reason for your visit?: Abdominal Pain  Chronicity: new  Onset: more than 1 month ago  Onset quality: gradual  Frequency: constantly  Episode duration: 1 hours  Progression since onset: rapidly worsening  Pain location: epigastric region  Pain - numeric: 10/10  Pain quality: sharp  Radiates to: epigastric region, back  anorexia: Yes  belching: Yes  flatus: Yes  headaches: Yes  hematochezia: No  melena: No  Aggravated by: eating  Relieved by: nothing  Diagnostic workup: ultrasound      The following portions of the patient's history were reviewed and updated as appropriate: allergies, current medications, past family history, past medical history, past social history, past surgical history and problem list.    Review of Systems   Constitutional: Positive for appetite change. Negative for fever and unexpected weight loss.   HENT: Negative for tinnitus.    Eyes: Negative for blurred vision.   Respiratory: Negative for shortness of breath.    Cardiovascular: Negative for chest pain.   Gastrointestinal: Positive for abdominal pain, constipation, diarrhea, nausea and indigestion. Negative for vomiting.        Bloating, bowel changes, gas   Genitourinary: Negative for dysuria, frequency and hematuria.   Musculoskeletal: Negative for arthralgias and myalgias.   Skin: Negative for rash.   Hematological:  Does not bruise/bleed easily.     I have reviewed the above ROS and updated as appropriate.    Objective   Physical Exam   Constitutional: She appears well-developed and well-nourished. No distress.   HENT:   Head: Normocephalic and atraumatic.   Eyes: EOM are normal.   Neck: Normal range of motion.   Cardiovascular: Normal rate.   Pulmonary/Chest: Effort normal. No respiratory distress.   Abdominal:   Periumbilical incisional scar noted   Musculoskeletal: Normal range of motion.   Neurological: She is alert. No cranial nerve deficit.   Psychiatric: She has a normal mood and affect. Her behavior is normal.   Vitals reviewed.        Assessment/Plan   There are no diagnoses linked to this encounter.Reviewed gallbladder ultrasound performed on 3/6/2020 consistent with a 2.7 cm mobile gallstone.    Recommend laparoscopic cholecystectomy.  Discussed possibility of conversion to the open procedure.  Discussed risks associated with procedure, including general anesthesia, bleeding, infection, and injury to surrounding structures.  Discussed likely feeling of bloating postoperatively, as well as possibility of loose stools postoperatively.    Recommend bland diet until surgery.  Patient understands and agrees to plan.    Ermelinda Gerardo MD, 3/11/2020 3:10 PM, acting as scribe for Dr. Gerardo.    I, Ermelinda Gerardo MD, personally performed the services described in this documentation as scribed by the above named individual in my presence, and it is both accurate and complete.  3/11/2020  15:32

## 2020-03-11 NOTE — PROGRESS NOTES
"Subjective   Sakina Langley is a 44 y.o. female.   Chief Complaint   Patient presents with   • Abdominal Pain     gallbladder     /74 (BP Location: Left arm, Patient Position: Sitting, Cuff Size: Adult)   Pulse 64   Resp 14   Ht 160 cm (63\")   Wt 54 kg (119 lb)   SpO2 100%   BMI 21.08 kg/m²     HISTORY OF PRESENT ILLNESS:  Patient is a pleasant 44-year-old female presenting for evaluation of cholelithiasis.  She complains of a 2-month history of epigastric pain, diarrhea, bloating.  Her symptoms are worse after eating and have been gradually worsening over time.  She classifies the most severe at this time.  She has been modifying her diet and is eating only yogurt and applesauce at this time.    Answers for HPI/ROS submitted by the patient on 3/11/2020   Abdominal pain  What is the primary reason for your visit?: Abdominal Pain  Chronicity: new  Onset: more than 1 month ago  Onset quality: gradual  Frequency: constantly  Episode duration: 1 hours  Progression since onset: rapidly worsening  Pain location: epigastric region  Pain - numeric: 10/10  Pain quality: sharp  Radiates to: epigastric region, back  anorexia: Yes  belching: Yes  flatus: Yes  headaches: Yes  hematochezia: No  melena: No  Aggravated by: eating  Relieved by: nothing  Diagnostic workup: ultrasound      The following portions of the patient's history were reviewed and updated as appropriate: allergies, current medications, past family history, past medical history, past social history, past surgical history and problem list.    Review of Systems   Constitutional: Positive for appetite change. Negative for fever and unexpected weight loss.   HENT: Negative for tinnitus.    Eyes: Negative for blurred vision.   Respiratory: Negative for shortness of breath.    Cardiovascular: Negative for chest pain.   Gastrointestinal: Positive for abdominal pain, constipation, diarrhea, nausea and indigestion. Negative for vomiting.        Bloating, " bowel changes, gas   Genitourinary: Negative for dysuria, frequency and hematuria.   Musculoskeletal: Negative for arthralgias and myalgias.   Skin: Negative for rash.   Hematological: Does not bruise/bleed easily.     I have reviewed the above ROS and updated as appropriate.    Objective   Physical Exam   Constitutional: She appears well-developed and well-nourished. No distress.   HENT:   Head: Normocephalic and atraumatic.   Eyes: EOM are normal.   Neck: Normal range of motion.   Cardiovascular: Normal rate.   Pulmonary/Chest: Effort normal. No respiratory distress.   Abdominal:   Periumbilical incisional scar noted   Musculoskeletal: Normal range of motion.   Neurological: She is alert. No cranial nerve deficit.   Psychiatric: She has a normal mood and affect. Her behavior is normal.   Vitals reviewed.        Assessment/Plan   Sakina was seen today for abdominal pain.    Diagnoses and all orders for this visit:    Cholecystitis    Reviewed gallbladder ultrasound performed on 3/6/2020 consistent with a 2.7 cm mobile gallstone.    Recommend laparoscopic cholecystectomy.  Discussed possibility of conversion to the open procedure.  Discussed risks associated with procedure, including general anesthesia, bleeding, infection, and injury to surrounding structures.  Discussed likely feeling of bloating postoperatively, as well as possibility of loose stools postoperatively.    Recommend bland diet until surgery.  Patient understands and agrees to plan.    Sakina Medley PA-C, 3/11/2020 3:10 PM, acting as scribe for Dr. Gerardo.    I, Ermelinda Gerardo MD, personally performed the services described in this documentation as scribed by the above named individual in my presence, and it is both accurate and complete.  3/11/2020  15:31

## 2020-03-16 ENCOUNTER — ANESTHESIA EVENT (OUTPATIENT)
Dept: PERIOP | Facility: HOSPITAL | Age: 45
End: 2020-03-16

## 2020-03-17 ENCOUNTER — ANESTHESIA (OUTPATIENT)
Dept: PERIOP | Facility: HOSPITAL | Age: 45
End: 2020-03-17

## 2020-03-17 ENCOUNTER — HOSPITAL ENCOUNTER (OUTPATIENT)
Facility: HOSPITAL | Age: 45
Setting detail: HOSPITAL OUTPATIENT SURGERY
Discharge: HOME | End: 2020-03-17
Attending: SURGERY | Admitting: SURGERY

## 2020-03-17 VITALS
TEMPERATURE: 97.7 F | WEIGHT: 118.61 LBS | SYSTOLIC BLOOD PRESSURE: 95 MMHG | DIASTOLIC BLOOD PRESSURE: 52 MMHG | RESPIRATION RATE: 14 BRPM | HEIGHT: 63 IN | HEART RATE: 82 BPM | BODY MASS INDEX: 21.02 KG/M2 | OXYGEN SATURATION: 97 %

## 2020-03-17 DIAGNOSIS — K81.9 CHOLECYSTITIS: ICD-10-CM

## 2020-03-17 LAB — B-HCG UR QL: NEGATIVE

## 2020-03-17 PROCEDURE — 88304 TISSUE EXAM BY PATHOLOGIST: CPT | Performed by: SURGERY

## 2020-03-17 PROCEDURE — 47562 LAPAROSCOPIC CHOLECYSTECTOMY: CPT | Performed by: PHYSICIAN ASSISTANT

## 2020-03-17 PROCEDURE — 25010000002 PROPOFOL 200 MG/20ML EMULSION: Performed by: ANESTHESIOLOGY

## 2020-03-17 PROCEDURE — 25010000002 FENTANYL CITRATE (PF) 250 MCG/5ML SOLUTION: Performed by: ANESTHESIOLOGY

## 2020-03-17 PROCEDURE — 25010000002 KETOROLAC TROMETHAMINE PER 15 MG: Performed by: ANESTHESIOLOGY

## 2020-03-17 PROCEDURE — 25010000002 DEXAMETHASONE PER 1 MG: Performed by: ANESTHESIOLOGY

## 2020-03-17 PROCEDURE — 81025 URINE PREGNANCY TEST: CPT | Performed by: SURGERY

## 2020-03-17 PROCEDURE — 47562 LAPAROSCOPIC CHOLECYSTECTOMY: CPT | Performed by: SURGERY

## 2020-03-17 PROCEDURE — 93005 ELECTROCARDIOGRAM TRACING: CPT | Performed by: SURGERY

## 2020-03-17 PROCEDURE — 25010000002 ONDANSETRON PER 1 MG: Performed by: ANESTHESIOLOGY

## 2020-03-17 PROCEDURE — 25010000002 HYDROMORPHONE PER 4 MG: Performed by: ANESTHESIOLOGY

## 2020-03-17 PROCEDURE — 25010000002 MIDAZOLAM PER 1 MG: Performed by: ANESTHESIOLOGY

## 2020-03-17 RX ORDER — SODIUM CHLORIDE 9 MG/ML
INJECTION, SOLUTION INTRAVENOUS AS NEEDED
Status: DISCONTINUED | OUTPATIENT
Start: 2020-03-17 | End: 2020-03-17 | Stop reason: HOSPADM

## 2020-03-17 RX ORDER — KETOROLAC TROMETHAMINE 30 MG/ML
INJECTION, SOLUTION INTRAMUSCULAR; INTRAVENOUS AS NEEDED
Status: DISCONTINUED | OUTPATIENT
Start: 2020-03-17 | End: 2020-03-17 | Stop reason: SURG

## 2020-03-17 RX ORDER — MIDAZOLAM HYDROCHLORIDE 1 MG/ML
INJECTION INTRAMUSCULAR; INTRAVENOUS AS NEEDED
Status: DISCONTINUED | OUTPATIENT
Start: 2020-03-17 | End: 2020-03-17 | Stop reason: SURG

## 2020-03-17 RX ORDER — NEOSTIGMINE METHYLSULFATE 5 MG/5 ML
SYRINGE (ML) INTRAVENOUS AS NEEDED
Status: DISCONTINUED | OUTPATIENT
Start: 2020-03-17 | End: 2020-03-17 | Stop reason: SURG

## 2020-03-17 RX ORDER — SCOLOPAMINE TRANSDERMAL SYSTEM 1 MG/1
PATCH, EXTENDED RELEASE TRANSDERMAL AS NEEDED
Status: DISCONTINUED | OUTPATIENT
Start: 2020-03-17 | End: 2020-03-17 | Stop reason: SURG

## 2020-03-17 RX ORDER — LIDOCAINE HYDROCHLORIDE 20 MG/ML
INJECTION, SOLUTION EPIDURAL; INFILTRATION; INTRACAUDAL; PERINEURAL AS NEEDED
Status: DISCONTINUED | OUTPATIENT
Start: 2020-03-17 | End: 2020-03-17 | Stop reason: SURG

## 2020-03-17 RX ORDER — DEXAMETHASONE SODIUM PHOSPHATE 4 MG/ML
INJECTION, SOLUTION INTRA-ARTICULAR; INTRALESIONAL; INTRAMUSCULAR; INTRAVENOUS; SOFT TISSUE AS NEEDED
Status: DISCONTINUED | OUTPATIENT
Start: 2020-03-17 | End: 2020-03-17 | Stop reason: SURG

## 2020-03-17 RX ORDER — OXYCODONE HYDROCHLORIDE AND ACETAMINOPHEN 5; 325 MG/1; MG/1
1 TABLET ORAL EVERY 6 HOURS PRN
Qty: 30 TABLET | Refills: 0 | Status: SHIPPED | OUTPATIENT
Start: 2020-03-17 | End: 2020-09-16

## 2020-03-17 RX ORDER — SODIUM CHLORIDE 0.9 % (FLUSH) 0.9 %
10 SYRINGE (ML) INJECTION EVERY 12 HOURS SCHEDULED
Status: DISCONTINUED | OUTPATIENT
Start: 2020-03-17 | End: 2020-03-17 | Stop reason: HOSPADM

## 2020-03-17 RX ORDER — GLYCOPYRROLATE 0.2 MG/ML
INJECTION INTRAMUSCULAR; INTRAVENOUS AS NEEDED
Status: DISCONTINUED | OUTPATIENT
Start: 2020-03-17 | End: 2020-03-17 | Stop reason: SURG

## 2020-03-17 RX ORDER — HYDROCODONE BITARTRATE AND ACETAMINOPHEN 7.5; 325 MG/1; MG/1
1 TABLET ORAL ONCE
Status: COMPLETED | OUTPATIENT
Start: 2020-03-17 | End: 2020-03-17

## 2020-03-17 RX ORDER — ONDANSETRON 2 MG/ML
INJECTION INTRAMUSCULAR; INTRAVENOUS AS NEEDED
Status: DISCONTINUED | OUTPATIENT
Start: 2020-03-17 | End: 2020-03-17 | Stop reason: SURG

## 2020-03-17 RX ORDER — HYDROMORPHONE HCL 110MG/55ML
0.5 PATIENT CONTROLLED ANALGESIA SYRINGE INTRAVENOUS
Status: DISCONTINUED | OUTPATIENT
Start: 2020-03-17 | End: 2020-03-17 | Stop reason: HOSPADM

## 2020-03-17 RX ORDER — MIDAZOLAM HYDROCHLORIDE 1 MG/ML
INJECTION INTRAMUSCULAR; INTRAVENOUS AS NEEDED
Status: DISCONTINUED | OUTPATIENT
Start: 2020-03-17 | End: 2020-03-17

## 2020-03-17 RX ORDER — FENTANYL CITRATE 50 UG/ML
INJECTION, SOLUTION INTRAMUSCULAR; INTRAVENOUS AS NEEDED
Status: DISCONTINUED | OUTPATIENT
Start: 2020-03-17 | End: 2020-03-17 | Stop reason: SURG

## 2020-03-17 RX ORDER — ROCURONIUM BROMIDE 10 MG/ML
INJECTION, SOLUTION INTRAVENOUS AS NEEDED
Status: DISCONTINUED | OUTPATIENT
Start: 2020-03-17 | End: 2020-03-17 | Stop reason: SURG

## 2020-03-17 RX ORDER — PROPOFOL 10 MG/ML
INJECTION, EMULSION INTRAVENOUS AS NEEDED
Status: DISCONTINUED | OUTPATIENT
Start: 2020-03-17 | End: 2020-03-17 | Stop reason: SURG

## 2020-03-17 RX ORDER — SODIUM CHLORIDE 0.9 % (FLUSH) 0.9 %
10 SYRINGE (ML) INJECTION AS NEEDED
Status: DISCONTINUED | OUTPATIENT
Start: 2020-03-17 | End: 2020-03-17 | Stop reason: HOSPADM

## 2020-03-17 RX ORDER — BUPIVACAINE HYDROCHLORIDE 2.5 MG/ML
INJECTION, SOLUTION INFILTRATION; PERINEURAL AS NEEDED
Status: DISCONTINUED | OUTPATIENT
Start: 2020-03-17 | End: 2020-03-17 | Stop reason: HOSPADM

## 2020-03-17 RX ORDER — SODIUM CHLORIDE, SODIUM LACTATE, POTASSIUM CHLORIDE, CALCIUM CHLORIDE 600; 310; 30; 20 MG/100ML; MG/100ML; MG/100ML; MG/100ML
9 INJECTION, SOLUTION INTRAVENOUS CONTINUOUS PRN
Status: DISCONTINUED | OUTPATIENT
Start: 2020-03-17 | End: 2020-03-17 | Stop reason: HOSPADM

## 2020-03-17 RX ADMIN — Medication 3 MG: at 10:01

## 2020-03-17 RX ADMIN — HYDROMORPHONE HYDROCHLORIDE 0.5 MG: 2 INJECTION, SOLUTION INTRAMUSCULAR; INTRAVENOUS; SUBCUTANEOUS at 11:18

## 2020-03-17 RX ADMIN — FENTANYL CITRATE 100 MCG: 50 INJECTION, SOLUTION INTRAMUSCULAR; INTRAVENOUS at 10:06

## 2020-03-17 RX ADMIN — HYDROMORPHONE HYDROCHLORIDE 0.5 MG: 2 INJECTION, SOLUTION INTRAMUSCULAR; INTRAVENOUS; SUBCUTANEOUS at 10:45

## 2020-03-17 RX ADMIN — LIDOCAINE HYDROCHLORIDE 60 MG: 20 INJECTION, SOLUTION EPIDURAL; INFILTRATION; INTRACAUDAL; PERINEURAL at 09:19

## 2020-03-17 RX ADMIN — KETOROLAC TROMETHAMINE 30 MG: 30 INJECTION, SOLUTION INTRAMUSCULAR at 10:02

## 2020-03-17 RX ADMIN — CEFAZOLIN SODIUM 2 G: 1 INJECTION, POWDER, FOR SOLUTION INTRAMUSCULAR; INTRAVENOUS at 09:23

## 2020-03-17 RX ADMIN — SODIUM CHLORIDE, SODIUM LACTATE, POTASSIUM CHLORIDE, AND CALCIUM CHLORIDE: .6; .31; .03; .02 INJECTION, SOLUTION INTRAVENOUS at 09:17

## 2020-03-17 RX ADMIN — HYDROMORPHONE HYDROCHLORIDE 0.5 MG: 2 INJECTION, SOLUTION INTRAMUSCULAR; INTRAVENOUS; SUBCUTANEOUS at 11:00

## 2020-03-17 RX ADMIN — GLYCOPYRROLATE 0.4 MG: 0.2 INJECTION, SOLUTION INTRAMUSCULAR; INTRAVENOUS at 10:02

## 2020-03-17 RX ADMIN — DEXAMETHASONE SODIUM PHOSPHATE 8 MG: 4 INJECTION, SOLUTION INTRAMUSCULAR; INTRAVENOUS at 10:02

## 2020-03-17 RX ADMIN — PROPOFOL 220 MG: 10 INJECTION, EMULSION INTRAVENOUS at 09:19

## 2020-03-17 RX ADMIN — MIDAZOLAM 2 MG: 1 INJECTION INTRAMUSCULAR; INTRAVENOUS at 09:19

## 2020-03-17 RX ADMIN — HYDROCODONE BITARTRATE AND ACETAMINOPHEN 1 TABLET: 7.5; 325 TABLET ORAL at 11:32

## 2020-03-17 RX ADMIN — FENTANYL CITRATE 100 MCG: 50 INJECTION, SOLUTION INTRAMUSCULAR; INTRAVENOUS at 09:20

## 2020-03-17 RX ADMIN — SCOPALAMINE 1 PATCH: 1 PATCH, EXTENDED RELEASE TRANSDERMAL at 08:50

## 2020-03-17 RX ADMIN — ROCURONIUM BROMIDE 30 MG: 10 INJECTION, SOLUTION INTRAVENOUS at 09:19

## 2020-03-17 RX ADMIN — ONDANSETRON 4 MG: 2 INJECTION INTRAMUSCULAR; INTRAVENOUS at 10:01

## 2020-03-17 NOTE — ANESTHESIA PROCEDURE NOTES
Airway  Urgency: elective    Date/Time: 3/17/2020 9:21 AM  End Time:3/17/2020 9:21 AM  Airway not difficult    General Information and Staff    Patient location during procedure: OR  Anesthesiologist: Mukesh Myers MD    Indications and Patient Condition  Indications for airway management: airway protection    Preoxygenated: yes  MILS maintained throughout  Mask difficulty assessment: 1 - vent by mask    Final Airway Details  Final airway type: endotracheal airway      Successful airway: ETT  Cuffed: yes   Successful intubation technique: direct laryngoscopy  Endotracheal tube insertion site: oral  Blade: Roosevelt  Blade size: 3  ETT size (mm): 7.0  Cormack-Lehane Classification: grade I - full view of glottis  Placement verified by: chest auscultation and capnometry   Measured from: teeth  ETT/EBT  to teeth (cm): 22  Number of attempts at approach: 1  Assessment: lips, teeth, and gum same as pre-op and atraumatic intubation    Additional Comments  X1 ATRAUMATIC.  TEETH IN PREOP CONDITION.  CUFF TO MINIMUM OCCLUSIVE CUFF PRESSURE. GAUZE BITE BLOCK

## 2020-03-17 NOTE — OP NOTE
Operative Note:    Patient Name:  Sakina Langley  YOB: 1975    Date of Surgery:  3/17/2020     Indications: Patient is a very pleasant 44-year-old female with cholelithiasis and cholecystitis    Pre-op Diagnosis:   Cholecystitis [K81.9]       Post-Op Diagnosis Codes:     * Cholecystitis [K81.9]    Procedure/CPT® Codes:      Procedure(s):  CHOLECYSTECTOMY LAPAROSCOPIC    Staff:  Surgeon(s):  Ermelinda Gerardo MD    Assistant: Sakina Medley PA-C    Anesthesia: General    Estimated Blood Loss: minimal    Implants:    Nothing was implanted during the procedure    Specimen:          Specimens     ID Source Type Tests Collected By Collected At Frozen?      A Gallbladder Tissue · TISSUE PATHOLOGY EXAM   Ermelinda Gerardo MD 3/17/20 0850                Findings: Distended gallbladder    Complications: None    Description of Procedure: Patient was brought to the operating room and transferred to the operating table in the standard supine position.  After adequate anesthesia was obtained a timeout was performed in the region of the abdomen was sterilely prepped and draped.  A 2 cm supraumbilical incision was made with a #15 blade and carried out probably to the level of the fascia.  Fascia was grasped with 2 Lafayette clamps and incised and a blunt finger sweep was performed.  2-0 Vicryl stay sutures were placed the Mcclain cannula was introduced and carbon dioxide insufflation was begun.  Under direct visualization 2 5 mm right upper quadrant ports and a 5 mm subxiphoid port were placed.  Gallbladder was identified and retracted cephalad.  Cystic duct and cystic artery reach identified dissected and seen clearly entering onto the gallbladder.  Each doubly clipped and divided area after this the gallbladder was elevated from the gallbladder fossa using Bovie cautery and the spatula apparatus.  It was removed under direct visualization via an Endo Catch bag from the umbilical port site.  Hemostasis was verified.   Irrigation was completed.  And all ports were removed.  The umbilical port site was closed with 0 Vicryl in a figure-of-eight.  Skin was closed with 4-0 Vicryl.    Patient tolerated the procedure well.    Sponge and instrument counts correct x2.        Ermelinda Gerardo MD     Date: 3/17/2020  Time: 10:09

## 2020-03-17 NOTE — ANESTHESIA PREPROCEDURE EVALUATION
Anesthesia Evaluation     Patient summary reviewed and Nursing notes reviewed   history of anesthetic complications: PONV  NPO Solid Status: > 8 hours  NPO Liquid Status: > 8 hours           Airway   Mallampati: II  TM distance: >3 FB  Neck ROM: full  No difficulty expected  Dental      Pulmonary - negative pulmonary ROS    breath sounds clear to auscultation  Cardiovascular - negative cardio ROS    ECG reviewed  Rhythm: regular  Rate: normal        Neuro/Psych  (+) psychiatric history Anxiety and Depression,     GI/Hepatic/Renal/Endo - negative ROS     Musculoskeletal (-) negative ROS    Abdominal     Abdomen: soft.   Substance History - negative use     OB/GYN negative ob/gyn ROS         Other - negative ROS                       Anesthesia Plan    ASA 2     general     intravenous induction     Anesthetic plan, all risks, benefits, and alternatives have been provided, discussed and informed consent has been obtained with: patient.    Plan discussed with CAA.

## 2020-03-17 NOTE — ANESTHESIA POSTPROCEDURE EVALUATION
Patient: Sakina Langley    Procedure Summary     Date:  03/17/20 Room / Location:  [facility] OR 08 / [facility] MAIN OR    Anesthesia Start:  0917 Anesthesia Stop:  1019    Procedure:  CHOLECYSTECTOMY LAPAROSCOPIC (N/A Abdomen) Diagnosis:       Cholecystitis      (Cholecystitis [K81.9])    Surgeon:  Ermelinda Gerardo MD Provider:  Mukesh Myers MD    Anesthesia Type:  general ASA Status:  2          Anesthesia Type: general    Vitals  Vitals Value Taken Time   BP 98/53 3/17/2020 11:34 AM   Temp 98 °F (36.7 °C) 3/17/2020 11:34 AM   Pulse 91 3/17/2020 11:34 AM   Resp 13 3/17/2020 11:34 AM   SpO2 96 % 3/17/2020 11:34 AM   Vitals shown include unvalidated device data.        Post Anesthesia Care and Evaluation    Patient location during evaluation: PACU  Patient participation: complete - patient participated  Level of consciousness: awake  Pain score: 0 (See nurse's notes for pain score)  Pain management: adequate  Airway patency: patent  Anesthetic complications: No anesthetic complications  PONV Status: none  Cardiovascular status: acceptable  Respiratory status: acceptable  Hydration status: acceptable    Comments: Patient seen and examined postoperatively; vital signs stable; SpO2 greater than or equal to 90%; cardiopulmonary status stable; nausea/vomiting adequately controlled; pain adequately controlled; no apparent anesthesia complications; patient discharged from anesthesia care when discharge criteria were met

## 2020-03-18 LAB
LAB AP CASE REPORT: NORMAL
PATH REPORT.FINAL DX SPEC: NORMAL
PATH REPORT.GROSS SPEC: NORMAL

## 2020-03-18 PROCEDURE — 93010 ELECTROCARDIOGRAM REPORT: CPT | Performed by: INTERNAL MEDICINE

## 2020-04-03 ENCOUNTER — OFFICE VISIT (OUTPATIENT)
Dept: SURGERY | Facility: CLINIC | Age: 45
End: 2020-04-03

## 2020-04-03 DIAGNOSIS — Z09 POSTOPERATIVE FOLLOW-UP: Primary | ICD-10-CM

## 2020-04-03 PROCEDURE — 99024 POSTOP FOLLOW-UP VISIT: CPT | Performed by: PHYSICIAN ASSISTANT

## 2020-04-03 RX ORDER — METOCLOPRAMIDE 5 MG/1
5 TABLET ORAL 3 TIMES DAILY
Qty: 90 TABLET | Refills: 0 | Status: SHIPPED | OUTPATIENT
Start: 2020-04-03 | End: 2020-05-03

## 2020-04-03 NOTE — PROGRESS NOTES
HPI  Sakina Langley presents for postoperative follow up s/p laparoscopic cholecystectomy on 3/17/2020. The patient has had no issues with the wound. The patient's reports bloating after eating, right back soreness.         Physical Exam  General:  alert, appears stated age and cooperative  Incision:   healing well, no drainage, no erythema, no swelling, incision well approximated  Physical Exam     Assessment/Plan   Assessment:    1. Postoperative follow-up    Slow GI motility status post laparoscopic cholecystectomy    Plan:     Pathology reviewed, consistent with Chronic calculous cholecystitis with cholesterolosis  Discussed pathology with patient    Advised patient no lifting over 25 pounds until 1 month postop.       New Medications Ordered This Visit   Medications   • metoclopramide (Reglan) 5 MG tablet     Sig: Take 1 tablet by mouth 3 (Three) Times a Day for 30 days.     Dispense:  90 tablet     Refill:  0   Reglan for bloating after eating.    Advised patient right back soreness should improve over time.  Call if it does not.    Call with any questions or concerns.    Return if symptoms worsen or fail to improve.     Sakina Medley PA-C  4/3/2020

## 2020-04-14 ENCOUNTER — TELEPHONE (OUTPATIENT)
Dept: FAMILY MEDICINE CLINIC | Facility: CLINIC | Age: 45
End: 2020-04-14

## 2020-04-14 ENCOUNTER — TELEPHONE (OUTPATIENT)
Dept: OBSTETRICS AND GYNECOLOGY | Facility: CLINIC | Age: 45
End: 2020-04-14

## 2020-04-14 DIAGNOSIS — F90.9 ATTENTION DEFICIT HYPERACTIVITY DISORDER (ADHD), UNSPECIFIED ADHD TYPE: ICD-10-CM

## 2020-04-14 RX ORDER — DEXTROAMPHETAMINE SACCHARATE, AMPHETAMINE ASPARTATE, DEXTROAMPHETAMINE SULFATE AND AMPHETAMINE SULFATE 5; 5; 5; 5 MG/1; MG/1; MG/1; MG/1
20 TABLET ORAL 2 TIMES DAILY
Qty: 60 TABLET | Refills: 0 | Status: SHIPPED | OUTPATIENT
Start: 2020-04-14 | End: 2020-05-18 | Stop reason: SDUPTHER

## 2020-04-14 NOTE — TELEPHONE ENCOUNTER
Patient would like a refill of Levonorgestrel and ethinyl .15mg/.03mg    [facility] pharmacy  [id number] [place] - [address] AT [facility] [address]  - [phone]  - [phone] (Phone)  [phone] (Fax)

## 2020-05-18 DIAGNOSIS — F90.9 ATTENTION DEFICIT HYPERACTIVITY DISORDER (ADHD), UNSPECIFIED ADHD TYPE: ICD-10-CM

## 2020-05-19 RX ORDER — DEXTROAMPHETAMINE SACCHARATE, AMPHETAMINE ASPARTATE, DEXTROAMPHETAMINE SULFATE AND AMPHETAMINE SULFATE 5; 5; 5; 5 MG/1; MG/1; MG/1; MG/1
20 TABLET ORAL 2 TIMES DAILY
Qty: 60 TABLET | Refills: 0 | Status: SHIPPED | OUTPATIENT
Start: 2020-05-19 | End: 2020-06-26 | Stop reason: SDUPTHER

## 2020-05-27 ENCOUNTER — TELEPHONE (OUTPATIENT)
Dept: OBSTETRICS AND GYNECOLOGY | Facility: CLINIC | Age: 45
End: 2020-05-27

## 2020-05-27 NOTE — TELEPHONE ENCOUNTER
Patient needed to reschedule her annual from tomorrow to a later date she could not make it in tomorrow. She would like to know if her birth control can continue to be filled to cover her till rescheduled date.

## 2020-06-26 ENCOUNTER — OFFICE VISIT (OUTPATIENT)
Dept: FAMILY MEDICINE CLINIC | Facility: CLINIC | Age: 45
End: 2020-06-26

## 2020-06-26 VITALS
SYSTOLIC BLOOD PRESSURE: 118 MMHG | BODY MASS INDEX: 21.26 KG/M2 | DIASTOLIC BLOOD PRESSURE: 73 MMHG | OXYGEN SATURATION: 97 % | TEMPERATURE: 98.2 F | HEART RATE: 75 BPM | HEIGHT: 63 IN | WEIGHT: 120 LBS

## 2020-06-26 DIAGNOSIS — R12 HEARTBURN: ICD-10-CM

## 2020-06-26 DIAGNOSIS — F90.9 ATTENTION DEFICIT HYPERACTIVITY DISORDER (ADHD), UNSPECIFIED ADHD TYPE: Primary | ICD-10-CM

## 2020-06-26 PROCEDURE — 99213 OFFICE O/P EST LOW 20 MIN: CPT | Performed by: FAMILY MEDICINE

## 2020-06-26 RX ORDER — DEXTROAMPHETAMINE SACCHARATE, AMPHETAMINE ASPARTATE, DEXTROAMPHETAMINE SULFATE AND AMPHETAMINE SULFATE 5; 5; 5; 5 MG/1; MG/1; MG/1; MG/1
20 TABLET ORAL 2 TIMES DAILY
Qty: 60 TABLET | Refills: 0 | Status: SHIPPED | OUTPATIENT
Start: 2020-06-26 | End: 2020-07-29 | Stop reason: SDUPTHER

## 2020-06-26 NOTE — PROGRESS NOTES
Subjective   Sakina Langley is a 44 y.o. female.     History of Present Illness     The patient present  for 3 months f/u on ADD and med refill. She denies difficulity concentration, focusing, anxiety, depression, and  insomnia. She is taking Adderal and tolerating well.      The following portions of the patient's history were reviewed and updated as appropriate: past medical history, past social history, past surgical history and problem list.    Review of Systems   Constitutional: Negative for fatigue and fever.   HENT: Negative for trouble swallowing.    Respiratory: Negative for shortness of breath.    Cardiovascular: Negative for chest pain and palpitations.   Gastrointestinal: Positive for indigestion. Negative for abdominal pain, nausea and vomiting.   Neurological: Negative for headache.   Psychiatric/Behavioral: Negative for agitation, decreased concentration, sleep disturbance, suicidal ideas and depressed mood. The patient is not nervous/anxious.        Objective   Physical Exam   Constitutional: She is oriented to person, place, and time. She appears well-developed.   Neck: Normal range of motion. Neck supple.   Cardiovascular: Normal rate, regular rhythm and normal heart sounds.   Pulmonary/Chest: Effort normal and breath sounds normal. She has no wheezes.   Musculoskeletal: Normal range of motion.   Neurological: She is alert and oriented to person, place, and time.   Psychiatric: She has a normal mood and affect. Her behavior is normal.   Vitals reviewed.    Vitals:    06/26/20 1112   BP: 118/73   Pulse: 75   Temp: 98.2 °F (36.8 °C)   SpO2: 97%     Current Outpatient Medications on File Prior to Visit   Medication Sig Dispense Refill   • Multiple Vitamins-Minerals (MULTIVITAMIN ADULT PO) Take 1 tablet by mouth Daily. Do not take after 3/11 for surgery     • Omeprazole 20 MG tablet delayed-release Take 20 mg by mouth Daily. 30 each 3   • oxyCODONE-acetaminophen (PERCOCET) 5-325 MG per tablet Take 1  tablet by mouth Every 6 (Six) Hours As Needed (Pain). 30 tablet 0   • QUASENSE 0.15-0.03 MG per tablet Take 1 tablet by mouth Daily. 84 tablet 1     No current facility-administered medications on file prior to visit.            Assessment/Plan   Problems Addressed this Visit        Digestive    Heartburn     Symptoms are improving continue current treatment regimen.  Discussed dietary changes and lifestyle modifications.            Other    Attention deficit hyperactivity disorder (ADHD) - Primary     Stable- continue Adderal.  The patient verified not suicidal at this time.         Relevant Medications    amphetamine-dextroamphetamine (ADDERALL) 20 MG tablet

## 2020-06-27 PROBLEM — R12 HEARTBURN: Status: ACTIVE | Noted: 2020-06-27

## 2020-06-28 NOTE — ASSESSMENT & PLAN NOTE
Symptoms are improving continue current treatment regimen.  Discussed dietary changes and lifestyle modifications.

## 2020-07-29 ENCOUNTER — TELEPHONE (OUTPATIENT)
Dept: FAMILY MEDICINE CLINIC | Facility: CLINIC | Age: 45
End: 2020-07-29

## 2020-07-29 DIAGNOSIS — F90.9 ATTENTION DEFICIT HYPERACTIVITY DISORDER (ADHD), UNSPECIFIED ADHD TYPE: ICD-10-CM

## 2020-07-29 RX ORDER — DEXTROAMPHETAMINE SACCHARATE, AMPHETAMINE ASPARTATE, DEXTROAMPHETAMINE SULFATE AND AMPHETAMINE SULFATE 5; 5; 5; 5 MG/1; MG/1; MG/1; MG/1
20 TABLET ORAL 2 TIMES DAILY
Qty: 60 TABLET | Refills: 0 | Status: SHIPPED | OUTPATIENT
Start: 2020-07-29 | End: 2020-08-31 | Stop reason: SDUPTHER

## 2020-08-31 ENCOUNTER — TELEPHONE (OUTPATIENT)
Dept: FAMILY MEDICINE CLINIC | Facility: CLINIC | Age: 45
End: 2020-08-31

## 2020-08-31 DIAGNOSIS — F90.9 ATTENTION DEFICIT HYPERACTIVITY DISORDER (ADHD), UNSPECIFIED ADHD TYPE: ICD-10-CM

## 2020-08-31 RX ORDER — DEXTROAMPHETAMINE SACCHARATE, AMPHETAMINE ASPARTATE, DEXTROAMPHETAMINE SULFATE AND AMPHETAMINE SULFATE 5; 5; 5; 5 MG/1; MG/1; MG/1; MG/1
20 TABLET ORAL 2 TIMES DAILY
Qty: 60 TABLET | Refills: 0 | Status: SHIPPED | OUTPATIENT
Start: 2020-08-31 | End: 2020-10-06 | Stop reason: SDUPTHER

## 2020-09-16 ENCOUNTER — OFFICE VISIT (OUTPATIENT)
Dept: OBSTETRICS AND GYNECOLOGY | Facility: CLINIC | Age: 45
End: 2020-09-16

## 2020-09-16 VITALS
SYSTOLIC BLOOD PRESSURE: 129 MMHG | HEIGHT: 63 IN | WEIGHT: 122 LBS | DIASTOLIC BLOOD PRESSURE: 74 MMHG | HEART RATE: 96 BPM | BODY MASS INDEX: 21.62 KG/M2

## 2020-09-16 DIAGNOSIS — Z01.419 WELL WOMAN EXAM WITH ROUTINE GYNECOLOGICAL EXAM: Primary | ICD-10-CM

## 2020-09-16 PROBLEM — L50.9 URTICARIA: Status: ACTIVE | Noted: 2018-05-21

## 2020-09-16 PROBLEM — R10.30 LOWER ABDOMINAL PAIN: Status: ACTIVE | Noted: 2017-01-31

## 2020-09-16 PROBLEM — Z79.899 OTHER LONG TERM (CURRENT) DRUG THERAPY: Status: ACTIVE | Noted: 2019-02-08

## 2020-09-16 PROCEDURE — 99396 PREV VISIT EST AGE 40-64: CPT | Performed by: OBSTETRICS & GYNECOLOGY

## 2020-09-16 RX ORDER — LEVONORGESTREL AND ETHINYL ESTRADIOL 0.15-0.03
1 KIT ORAL DAILY
Qty: 84 TABLET | Refills: 4 | Status: SHIPPED | OUTPATIENT
Start: 2020-09-16 | End: 2020-09-25

## 2020-09-16 NOTE — PROGRESS NOTES
"Subjective   Sakina Langley is a 45 y.o. female   Chief Complaint   Patient presents with   • Annual Exam     pap:  NILM -HPV, mammo: \"several yrs ago\"      History of Present Illness  Sakina Langley is a 45 y.o.  who presents for an annual examination.  Reports that her bleeding is better on the oral contraceptive pill that she is currently.      Pap history:  Last pap: 2017 NIL, HPV negative  Prior abnormal paps: no  STDs  Sexually active: no  History of STDs: no  Contraception:  OCP, desire   Mammogram: has had one since age of 42.   Colonoscopy - never    History of physical abuse: no, History of sexual abuse: no and History of verbal/emotional abuse: no    Screening for BRCA-   Is patient's family history significant for any of the following?   Yes, prior genetic testing was negative  For non-Ashkenazi Sikhism women:   Two first-degree relatives with breast cancer, one of whom was diagnosed at age 50 or younger   A combination of three or more first or second-degree relatives with breast cancer regardless of age at diagnosis   A combination of both breast and ovarian cancer among first and second-degree relatives   A first-degree relative with bilateral breast cancer   A combination of two or more first or second degree relatives with ovarian cancer, regardless of age at diagnosis   A first or second-degree relative with both breast and ovarian cancer at any age   History of breast cancer in a male relative   For women of Ashkenazi Sikhism descent:   Any first-degree relative (or two second degree relatives on the same side of the family) with breast or ovarian cancer   Recommendations from the [place] Preventive Services Task Force on who should be offered genetic testing for BRCA mutations*     Past Medical History:   Diagnosis Date   • Abdominal pain, right lower quadrant    • ADHD    • Cholelithiasis 3/2020   • Encounter for therapeutic drug monitoring    • High risk medication use    • " Impacted cerumen of right ear    • Lower back pain    • Nausea    • Otitis media, acute    • PONV (postoperative nausea and vomiting)    • Urticaria    • Warts     MULTIPLE     Past Surgical History:   Procedure Laterality Date   • BREAST AUGMENTATION     •  SECTION     • CHOLECYSTECTOMY N/A 3/17/2020    Procedure: CHOLECYSTECTOMY LAPAROSCOPIC;  Surgeon: Ermelinda Gerardo MD;  Location: [facility] MAIN OR;  Service: General;  Laterality: N/A;   • DIAGNOSTIC LAPAROSCOPY     • MOUTH SURGERY      Pallet of mouth   • TONSILLECTOMY       Social History     Tobacco Use   • Smoking status: Never Smoker   • Smokeless tobacco: Never Used   Substance Use Topics   • Alcohol use: Yes     Comment: Occasional bourbon   • Drug use: Never     Family History   Problem Relation Age of Onset   • Ovarian cancer Mother          in her 30's   • Cancer Mother    • Miscarriages / Stillbirths Mother    • Ovarian cancer Maternal Aunt         unknown    • Lung cancer Maternal Aunt         5 AUNTS-MOMS SIDE    • Lung cancer Other         3 RELATIVES    • Arthritis Paternal Grandfather    • Diabetes Paternal Grandmother    • Hypertension Father    • Breast cancer Neg Hx    • Uterine cancer Neg Hx    • Colon cancer Neg Hx    • Deep vein thrombosis Neg Hx    • Pulmonary embolism Neg Hx      Current Outpatient Medications on File Prior to Visit   Medication Sig Dispense Refill   • amphetamine-dextroamphetamine (ADDERALL) 20 MG tablet Take 1 tablet by mouth 2 (Two) Times a Day. 60 tablet 0   • Multiple Vitamins-Minerals (MULTIVITAMIN ADULT PO) Take 1 tablet by mouth Daily. Do not take after 3/11 for surgery     • QUASENSE 0.15-0.03 MG per tablet Take 1 tablet by mouth Daily. 84 tablet 1   • [DISCONTINUED] Omeprazole 20 MG tablet delayed-release Take 20 mg by mouth Daily. 30 each 3   • [DISCONTINUED] oxyCODONE-acetaminophen (PERCOCET) 5-325 MG per tablet Take 1 tablet by mouth Every 6 (Six) Hours As Needed (Pain). 30 tablet 0     No current  "facility-administered medications on file prior to visit.      No Known Allergies       Review of Systems   Constitutional: Negative for chills, fever and unexpected weight change.   HENT: Negative for congestion, nosebleeds and sore throat.    Eyes: Negative for visual disturbance.   Respiratory: Negative for cough, chest tightness and shortness of breath.    Cardiovascular: Negative for chest pain and palpitations.   Gastrointestinal: Negative for abdominal pain, constipation, diarrhea, nausea and vomiting.   Endocrine: Negative for polyuria.   Genitourinary: Negative for difficulty urinating, dyspareunia, dysuria, frequency, genital sores, hematuria, menstrual problem, pelvic pain, urgency, vaginal bleeding, vaginal discharge and vaginal pain.   Musculoskeletal: Negative for arthralgias and joint swelling.   Skin: Negative for color change and rash.   Neurological: Negative for dizziness, light-headedness and headaches.   Hematological: Does not bruise/bleed easily.   Psychiatric/Behavioral: Negative for dysphoric mood. The patient is not nervous/anxious.        Objective    /74   Pulse 96   Ht 160 cm (62.99\")   Wt 55.3 kg (122 lb)   Breastfeeding No   BMI 21.62 kg/m²    Physical Exam   Constitutional: She is oriented to person, place, and time. She appears well-developed. No distress.   HENT:   Head: Normocephalic and atraumatic.   Neck: No tracheal deviation present. No thyromegaly present.   Cardiovascular: Normal rate, regular rhythm and normal heart sounds. Exam reveals no gallop and no friction rub.   No murmur heard.  Pulmonary/Chest: Effort normal and breath sounds normal. No respiratory distress. She has no wheezes. She has no rales. She exhibits no tenderness. Right breast exhibits no inverted nipple, no mass, no nipple discharge, no skin change and no tenderness. Left breast exhibits no inverted nipple, no mass, no nipple discharge, no skin change and no tenderness.   Abdominal: Soft. She " exhibits no distension and no mass. There is no abdominal tenderness.   Genitourinary: There is no rash, lesion or injury on the right labia. There is no rash, lesion or injury on the left labia. Uterus is not deviated, not enlarged, not fixed and not tender. Cervix exhibits no motion tenderness, no discharge and no friability. Right adnexum displays no mass, no tenderness and no fullness. Left adnexum displays no mass, no tenderness and no fullness.    No vaginal discharge, tenderness or bleeding.   No tenderness or bleeding in the vagina.   Musculoskeletal: Normal range of motion. No deformity.   Lymphadenopathy:     She has no cervical adenopathy.   Neurological: She is alert and oriented to person, place, and time.   Skin: Skin is warm and dry. No rash noted. She is not diaphoretic.   Psychiatric: Her behavior is normal. Judgment and thought content normal.         Assessment/Plan   Problems Addressed this Visit     None      Visit Diagnoses     Well woman exam with routine gynecological exam    -  Primary        Well woman counseling/screening:    Cervical cancer screening:    Reports no h/o cervical dysplasia   The patient is due for a pap in 2022.    Screening guidelines discussed with patient  Breast cancer screening:    Clinical breast exam recommended for age 20-39 years every 1-3 years   Mammogram recommended starting age 40, Discussed risks and benefits of earlier versus later screening. Patient plans to schedule   Breast self awareness encouraged  STD Screening   Declines  Contraception :   Reviewed that Seasonale was sent. Will call pharmacy and request only Quasense or brand seasonale  Family history   Has had negative genetic testing given maternal h/o ovarian cancer in mom and maternal aunts   Recommend given bloating that she consider TVUS (although it is primarily bloating with eating and she is waiting for improvement with PCP.  If no improvement, please return for ultrasound).  Pt in agreement.   Prefers expectant management for now, but will return for US if symptoms persist and GI work up is negative  Healthy lifestyle counseling:   Patient was counseled on    Body mass index is 21.62 kg/m².

## 2020-09-25 RX ORDER — LEVONORGESTREL AND ETHINYL ESTRADIOL 0.15-0.03
1 KIT ORAL DAILY
Qty: 94 TABLET | Refills: 4 | Status: SHIPPED | OUTPATIENT
Start: 2020-09-25 | End: 2021-10-07 | Stop reason: SDUPTHER

## 2020-10-06 ENCOUNTER — OFFICE VISIT (OUTPATIENT)
Dept: FAMILY MEDICINE CLINIC | Facility: CLINIC | Age: 45
End: 2020-10-06

## 2020-10-06 VITALS
BODY MASS INDEX: 21.62 KG/M2 | HEIGHT: 63 IN | SYSTOLIC BLOOD PRESSURE: 112 MMHG | WEIGHT: 122 LBS | DIASTOLIC BLOOD PRESSURE: 77 MMHG | HEART RATE: 90 BPM | OXYGEN SATURATION: 97 % | TEMPERATURE: 97.7 F

## 2020-10-06 DIAGNOSIS — F90.9 ATTENTION DEFICIT HYPERACTIVITY DISORDER (ADHD), UNSPECIFIED ADHD TYPE: Primary | ICD-10-CM

## 2020-10-06 DIAGNOSIS — M54.6 MIDLINE THORACIC BACK PAIN, UNSPECIFIED CHRONICITY: ICD-10-CM

## 2020-10-06 PROCEDURE — 99213 OFFICE O/P EST LOW 20 MIN: CPT | Performed by: FAMILY MEDICINE

## 2020-10-06 RX ORDER — CYCLOBENZAPRINE HCL 10 MG
10 TABLET ORAL 3 TIMES DAILY PRN
Qty: 20 TABLET | Refills: 0 | Status: SHIPPED | OUTPATIENT
Start: 2020-10-06 | End: 2022-05-05

## 2020-10-06 RX ORDER — DEXTROAMPHETAMINE SACCHARATE, AMPHETAMINE ASPARTATE, DEXTROAMPHETAMINE SULFATE AND AMPHETAMINE SULFATE 5; 5; 5; 5 MG/1; MG/1; MG/1; MG/1
20 TABLET ORAL 2 TIMES DAILY
Qty: 60 TABLET | Refills: 0 | Status: SHIPPED | OUTPATIENT
Start: 2020-10-06 | End: 2020-11-03 | Stop reason: SDUPTHER

## 2020-10-06 NOTE — PROGRESS NOTES
Subjective   Sakina Langley is a 45 y.o. female.     Back Pain  This is a new problem. The current episode started more than 1 month ago. The problem occurs intermittently. The problem is unchanged. The pain is present in the thoracic spine. The pain is at a severity of 6/10. The pain is moderate. The symptoms are aggravated by bending and standing. Pertinent negatives include no abdominal pain, chest pain, dysuria, fever, leg pain, numbness or tingling.    ADD  The patient present  for 3 months f/u on ADD and med refill. She is doing well, tolerating adderall well , denies difficulity concentration, focusing, anxiety, depression, and  insomnia.          The following portions of the patient's history were reviewed and updated as appropriate: past medical history, past social history, past surgical history and problem list.    Review of Systems   Constitutional: Negative for fatigue and fever.   Respiratory: Negative for shortness of breath.    Cardiovascular: Negative for chest pain and palpitations.   Gastrointestinal: Negative for abdominal pain.   Genitourinary: Negative for dysuria and urgency.   Musculoskeletal: Positive for back pain.        Mid back oain   Neurological: Negative for dizziness, tingling, numbness and headache.   Psychiatric/Behavioral: Negative for sleep disturbance, suicidal ideas and depressed mood. The patient is not nervous/anxious.        Objective   Physical Exam  Vitals signs reviewed.   Constitutional:       Appearance: Normal appearance. She is well-developed.   HENT:      Head: Atraumatic.   Neck:      Musculoskeletal: Normal range of motion and neck supple.      Thyroid: No thyromegaly.   Cardiovascular:      Rate and Rhythm: Normal rate and regular rhythm.      Pulses: Normal pulses.      Heart sounds: Normal heart sounds.   Pulmonary:      Breath sounds: Normal breath sounds. No wheezing.   Abdominal:      General: Bowel sounds are normal.      Tenderness: There is no abdominal  tenderness.   Musculoskeletal:      Thoracic back: She exhibits tenderness. She exhibits normal range of motion, no swelling and no deformity.   Neurological:      General: No focal deficit present.      Mental Status: She is alert and oriented to person, place, and time.   Psychiatric:         Mood and Affect: Mood normal.         Behavior: Behavior normal.       Vitals:    10/06/20 0945   BP: 112/77   Pulse: 90   Temp: 97.7 °F (36.5 °C)   SpO2: 97%     Current Outpatient Medications on File Prior to Visit   Medication Sig Dispense Refill   • levonorgestrel-ethinyl estradiol (Introvale) 0.15-0.03 MG per tablet Take 1 tablet by mouth Daily. 94 tablet 4   • Multiple Vitamins-Minerals (MULTIVITAMIN ADULT PO) Take 1 tablet by mouth Daily. Do not take after 3/11 for surgery       No current facility-administered medications on file prior to visit.            Assessment/Plan   Problems Addressed this Visit        Nervous and Auditory    Midline thoracic back pain     Discussed conservative management with ice, NSAIDs and activity as tolerate.         Relevant Orders    XR Spine Thoracic 2 View       Other    ADHD - Primary     Psychological condition is improving with treatment.  Continue current treatment regimen.  Regular aerobic exercise.  Psychological condition  will be reassessed in 3 months.         Relevant Medications    amphetamine-dextroamphetamine (ADDERALL) 20 MG tablet      Diagnoses       Codes Comments    Attention deficit hyperactivity disorder (ADHD), unspecified ADHD type    -  Primary ICD-10-CM: F90.9  ICD-9-CM: 314.01     Midline thoracic back pain, unspecified chronicity     ICD-10-CM: M54.6  ICD-9-CM: 724.1

## 2020-11-03 ENCOUNTER — TELEPHONE (OUTPATIENT)
Dept: FAMILY MEDICINE CLINIC | Facility: CLINIC | Age: 45
End: 2020-11-03

## 2020-11-03 DIAGNOSIS — F90.9 ATTENTION DEFICIT HYPERACTIVITY DISORDER (ADHD), UNSPECIFIED ADHD TYPE: ICD-10-CM

## 2020-11-03 RX ORDER — DEXTROAMPHETAMINE SACCHARATE, AMPHETAMINE ASPARTATE, DEXTROAMPHETAMINE SULFATE AND AMPHETAMINE SULFATE 5; 5; 5; 5 MG/1; MG/1; MG/1; MG/1
20 TABLET ORAL 2 TIMES DAILY
Qty: 60 TABLET | Refills: 0 | Status: SHIPPED | OUTPATIENT
Start: 2020-11-03 | End: 2020-11-30 | Stop reason: SDUPTHER

## 2020-11-30 ENCOUNTER — TELEPHONE (OUTPATIENT)
Dept: FAMILY MEDICINE CLINIC | Facility: CLINIC | Age: 45
End: 2020-11-30

## 2020-11-30 DIAGNOSIS — F90.9 ATTENTION DEFICIT HYPERACTIVITY DISORDER (ADHD), UNSPECIFIED ADHD TYPE: ICD-10-CM

## 2020-11-30 RX ORDER — DEXTROAMPHETAMINE SACCHARATE, AMPHETAMINE ASPARTATE, DEXTROAMPHETAMINE SULFATE AND AMPHETAMINE SULFATE 5; 5; 5; 5 MG/1; MG/1; MG/1; MG/1
20 TABLET ORAL 2 TIMES DAILY
Qty: 60 TABLET | Refills: 0 | Status: SHIPPED | OUTPATIENT
Start: 2020-11-30 | End: 2021-01-11 | Stop reason: SDUPTHER

## 2021-01-11 DIAGNOSIS — F90.9 ATTENTION DEFICIT HYPERACTIVITY DISORDER (ADHD), UNSPECIFIED ADHD TYPE: ICD-10-CM

## 2021-01-11 NOTE — TELEPHONE ENCOUNTER
PATIENT NEEDS REFILL ON:amphetamine-dextroamphetamine (ADDERALL) 20 MG tablet     PATIENT CAN BE REACHED ON: [phone]    PHARMACY PREFERRED:[facility] #[id number] - [place], [place] [id number] [facility] AT [facility] [place] [address]  - [phone]  - [phone] FX

## 2021-01-12 RX ORDER — DEXTROAMPHETAMINE SACCHARATE, AMPHETAMINE ASPARTATE, DEXTROAMPHETAMINE SULFATE AND AMPHETAMINE SULFATE 5; 5; 5; 5 MG/1; MG/1; MG/1; MG/1
20 TABLET ORAL 2 TIMES DAILY
Qty: 60 TABLET | Refills: 0 | Status: SHIPPED | OUTPATIENT
Start: 2021-01-12 | End: 2021-02-26 | Stop reason: SDUPTHER

## 2021-02-25 ENCOUNTER — OFFICE VISIT (OUTPATIENT)
Dept: FAMILY MEDICINE CLINIC | Facility: CLINIC | Age: 46
End: 2021-02-25

## 2021-02-25 VITALS
OXYGEN SATURATION: 99 % | HEIGHT: 63 IN | BODY MASS INDEX: 21.79 KG/M2 | DIASTOLIC BLOOD PRESSURE: 78 MMHG | HEART RATE: 88 BPM | WEIGHT: 123 LBS | TEMPERATURE: 97.3 F | SYSTOLIC BLOOD PRESSURE: 118 MMHG

## 2021-02-25 DIAGNOSIS — F90.9 ATTENTION DEFICIT HYPERACTIVITY DISORDER (ADHD), UNSPECIFIED ADHD TYPE: Primary | ICD-10-CM

## 2021-02-25 DIAGNOSIS — F41.9 ANXIETY: ICD-10-CM

## 2021-02-25 PROCEDURE — 99213 OFFICE O/P EST LOW 20 MIN: CPT | Performed by: FAMILY MEDICINE

## 2021-02-25 NOTE — PROGRESS NOTES
Subjective   Sakina Langley is a 45 y.o. female.     History of Present Illness   The patient present  for 3 months f/u on ADD and med refill. She is doing well, tolerating adderall well , denies difficulity concentration, focusing, anxiety, depression, and  insomnia.  She is taking medication as prescribed and denies any medication related side effects.       The following portions of the patient's history were reviewed and updated as appropriate: past medical history, past social history, past surgical history and problem list.    Review of Systems   Constitutional: Negative for fatigue and fever.   HENT: Negative for trouble swallowing.    Respiratory: Negative for shortness of breath.    Cardiovascular: Negative for chest pain and palpitations.   Psychiatric/Behavioral: Negative for sleep disturbance and depressed mood. The patient is not nervous/anxious.        Objective   Physical Exam  Vitals signs reviewed.   Constitutional:       General: She is not in acute distress.     Appearance: She is well-developed.   Neck:      Musculoskeletal: Normal range of motion and neck supple.      Thyroid: No thyromegaly.   Cardiovascular:      Rate and Rhythm: Normal rate.   Pulmonary:      Effort: Pulmonary effort is normal.      Breath sounds: Normal breath sounds. No wheezing.   Abdominal:      General: Bowel sounds are normal.      Palpations: Abdomen is soft.   Neurological:      Mental Status: She is alert and oriented to person, place, and time.       Vitals:    02/25/21 1341   BP: 118/78   Pulse: 88   Temp: 97.3 °F (36.3 °C)   SpO2: 99%     Current Outpatient Medications on File Prior to Visit   Medication Sig Dispense Refill   • cyclobenzaprine (FLEXERIL) 10 MG tablet Take 1 tablet by mouth 3 (Three) Times a Day As Needed for Muscle Spasms. 20 tablet 0   • levonorgestrel-ethinyl estradiol (Introvale) 0.15-0.03 MG per tablet Take 1 tablet by mouth Daily. 94 tablet 4   • Multiple Vitamins-Minerals (MULTIVITAMIN ADULT  PO) Take 1 tablet by mouth Daily. Do not take after 3/11 for surgery     • [DISCONTINUED] amphetamine-dextroamphetamine (ADDERALL) 20 MG tablet Take 1 tablet by mouth 2 (Two) Times a Day. 60 tablet 0     No current facility-administered medications on file prior to visit.          Assessment/Plan   Problems Addressed this Visit        Mental Health    ADHD - Primary     ADD symptoms are stable continue Adderall as prescribed.  Patient verified not suicidal at this time.         Relevant Medications    amphetamine-dextroamphetamine (ADDERALL) 20 MG tablet    Anxiety     Symptoms are controlled.           Diagnoses       Codes Comments    Attention deficit hyperactivity disorder (ADHD), unspecified ADHD type    -  Primary ICD-10-CM: F90.9  ICD-9-CM: 314.01     Anxiety     ICD-10-CM: F41.9  ICD-9-CM: 300.00

## 2021-02-26 RX ORDER — DEXTROAMPHETAMINE SACCHARATE, AMPHETAMINE ASPARTATE, DEXTROAMPHETAMINE SULFATE AND AMPHETAMINE SULFATE 5; 5; 5; 5 MG/1; MG/1; MG/1; MG/1
20 TABLET ORAL 2 TIMES DAILY
Qty: 60 TABLET | Refills: 0 | Status: SHIPPED | OUTPATIENT
Start: 2021-02-26 | End: 2021-04-05 | Stop reason: SDUPTHER

## 2021-02-26 NOTE — ASSESSMENT & PLAN NOTE
ADD symptoms are stable continue Adderall as prescribed.  Patient verified not suicidal at this time.

## 2021-04-05 DIAGNOSIS — F90.9 ATTENTION DEFICIT HYPERACTIVITY DISORDER (ADHD), UNSPECIFIED ADHD TYPE: ICD-10-CM

## 2021-04-05 NOTE — TELEPHONE ENCOUNTER
Caller: Sakina Langley    Relationship: Self    Best call back number: [phone]    Medication needed:   Requested Prescriptions     Pending Prescriptions Disp Refills   • amphetamine-dextroamphetamine (ADDERALL) 20 MG tablet 60 tablet 0     Sig: Take 1 tablet by mouth 2 (Two) Times a Day.     What is the patient's preferred pharmacy: [facility] #[id number] - [place], [place] - [address] [place] [facility] [place] [address] [facility] - [phone] [facility] [phone] FX

## 2021-04-06 RX ORDER — DEXTROAMPHETAMINE SACCHARATE, AMPHETAMINE ASPARTATE, DEXTROAMPHETAMINE SULFATE AND AMPHETAMINE SULFATE 5; 5; 5; 5 MG/1; MG/1; MG/1; MG/1
20 TABLET ORAL 2 TIMES DAILY
Qty: 60 TABLET | Refills: 0 | Status: SHIPPED | OUTPATIENT
Start: 2021-04-06 | End: 2021-05-06 | Stop reason: SDUPTHER

## 2021-05-06 ENCOUNTER — TELEPHONE (OUTPATIENT)
Dept: FAMILY MEDICINE CLINIC | Facility: CLINIC | Age: 46
End: 2021-05-06

## 2021-05-06 DIAGNOSIS — F90.9 ATTENTION DEFICIT HYPERACTIVITY DISORDER (ADHD), UNSPECIFIED ADHD TYPE: ICD-10-CM

## 2021-05-06 RX ORDER — DEXTROAMPHETAMINE SACCHARATE, AMPHETAMINE ASPARTATE, DEXTROAMPHETAMINE SULFATE AND AMPHETAMINE SULFATE 5; 5; 5; 5 MG/1; MG/1; MG/1; MG/1
20 TABLET ORAL 2 TIMES DAILY
Qty: 60 TABLET | Refills: 0 | Status: SHIPPED | OUTPATIENT
Start: 2021-05-06 | End: 2021-06-02 | Stop reason: SDUPTHER

## 2021-05-06 NOTE — TELEPHONE ENCOUNTER
Caller: Sakina Langley    Relationship: Self    Best call back number: [phone] (M)  Medication needed:   Requested Prescriptions     Pending Prescriptions Disp Refills   • amphetamine-dextroamphetamine (ADDERALL) 20 MG tablet 60 tablet 0     Sig: Take 1 tablet by mouth 2 (Two) Times a Day.       When do you need the refill by: AS SOON AS POSSIBLE    What additional details did the patient provide when requesting the medication: PATIENT IS OUT OF PRESCRIPTION.    Does the patient have less than a 3 day supply:  [x] Yes  [] No    What is the patient's preferred pharmacy:    [facility] #[id number] - [place], [place] - [address] AT [facility] [address]  - [phone] [facility] [phone]   [phone]      PATIENTS LAST OV: 2/25/21  NO FUTURE VISITS SCHEDULED .

## 2021-06-02 DIAGNOSIS — F90.9 ATTENTION DEFICIT HYPERACTIVITY DISORDER (ADHD), UNSPECIFIED ADHD TYPE: ICD-10-CM

## 2021-06-02 RX ORDER — DEXTROAMPHETAMINE SACCHARATE, AMPHETAMINE ASPARTATE, DEXTROAMPHETAMINE SULFATE AND AMPHETAMINE SULFATE 5; 5; 5; 5 MG/1; MG/1; MG/1; MG/1
20 TABLET ORAL 2 TIMES DAILY
Qty: 60 TABLET | Refills: 0 | Status: SHIPPED | OUTPATIENT
Start: 2021-06-02 | End: 2021-07-06 | Stop reason: SDUPTHER

## 2021-06-02 NOTE — TELEPHONE ENCOUNTER
Caller: Sakina Langley    Relationship: Self    Best call back number:[phone]  Medication needed:   Requested Prescriptions     Pending Prescriptions Disp Refills   • amphetamine-dextroamphetamine (ADDERALL) 20 MG tablet 60 tablet 0     Sig: Take 1 tablet by mouth 2 (Two) Times a Day.       When do you need the refill by: 06/02    What additional details did the patient provide when requesting the medication: PATIENT NEEDS REFILL ASAP SHE IS DUE FOR A MED F/U BUT NO AVAILABLE APTS WITH DR. KASPER UNTIL 07/06 SHE IS SCHEDULED FOR THEN BUT UNTIL THEN WILL NEED A REFILL ASAP    Does the patient have less than a 3 day supply:  [x] Yes  [] No    What is the patient's preferred pharmacy: [facility] #[id number] [place] [address] [facility] AT [facility] [address]  - [phone] [facility] [phone] FX

## 2021-07-06 ENCOUNTER — OFFICE VISIT (OUTPATIENT)
Dept: FAMILY MEDICINE CLINIC | Facility: CLINIC | Age: 46
End: 2021-07-06

## 2021-07-06 VITALS
OXYGEN SATURATION: 100 % | HEART RATE: 89 BPM | WEIGHT: 124 LBS | SYSTOLIC BLOOD PRESSURE: 113 MMHG | DIASTOLIC BLOOD PRESSURE: 78 MMHG | HEIGHT: 63 IN | BODY MASS INDEX: 21.97 KG/M2 | TEMPERATURE: 97.1 F

## 2021-07-06 DIAGNOSIS — F41.9 ANXIETY: ICD-10-CM

## 2021-07-06 DIAGNOSIS — F90.9 ATTENTION DEFICIT HYPERACTIVITY DISORDER (ADHD), UNSPECIFIED ADHD TYPE: Primary | ICD-10-CM

## 2021-07-06 PROCEDURE — 99214 OFFICE O/P EST MOD 30 MIN: CPT | Performed by: FAMILY MEDICINE

## 2021-07-06 RX ORDER — AMOXICILLIN 500 MG/1
1 CAPSULE ORAL 3 TIMES DAILY
COMMUNITY
Start: 2021-06-10 | End: 2021-12-13

## 2021-07-06 RX ORDER — DEXTROAMPHETAMINE SACCHARATE, AMPHETAMINE ASPARTATE, DEXTROAMPHETAMINE SULFATE AND AMPHETAMINE SULFATE 5; 5; 5; 5 MG/1; MG/1; MG/1; MG/1
20 TABLET ORAL 2 TIMES DAILY
Qty: 60 TABLET | Refills: 0 | Status: SHIPPED | OUTPATIENT
Start: 2021-07-06 | End: 2021-08-06 | Stop reason: SDUPTHER

## 2021-07-06 RX ORDER — HYDROCODONE BITARTRATE AND ACETAMINOPHEN 5; 325 MG/1; MG/1
1 TABLET ORAL SEE ADMIN INSTRUCTIONS
COMMUNITY
Start: 2021-06-10 | End: 2022-05-05

## 2021-07-06 RX ORDER — CITALOPRAM 10 MG/1
10 TABLET ORAL DAILY
Qty: 20 TABLET | Refills: 3 | Status: SHIPPED | OUTPATIENT
Start: 2021-07-06 | End: 2021-12-13 | Stop reason: SINTOL

## 2021-07-06 NOTE — ASSESSMENT & PLAN NOTE
Psychological condition is improving with treatment.  Continue current dose of Adderall.  Refill medication.  Regular aerobic exercise.  Psychological condition  will be reassessed in 3 months.

## 2021-07-06 NOTE — PROGRESS NOTES
Subjective   Sakina Langley is a 45 y.o. female.     Anxiety  Presents for follow-up visit. Onset was 1 to 6 months ago. The problem has been gradually worsening. Symptoms include excessive worry and nervous/anxious behavior. Patient reports no chest pain, decreased concentration, depressed mood, dizziness, insomnia, irritability, malaise, palpitations, shortness of breath or suicidal ideas. Symptoms occur most days. The severity of symptoms is moderate. The quality of sleep is fair.        ADD  The patient present  for 3 months f/u on ADD and med refill. She  denies difficulity concentration, focusing and insomnia.  She is taking Adderall and tolerating well denies any medication related side effects.       The following portions of the patient's history were reviewed and updated as appropriate: past medical history, past social history, past surgical history and problem list.    Review of Systems   Constitutional: Negative for fatigue, fever and irritability.   HENT: Negative for trouble swallowing.    Respiratory: Negative for cough, shortness of breath and wheezing.    Cardiovascular: Negative for chest pain and palpitations.   Gastrointestinal: Negative for abdominal pain.   Neurological: Negative for dizziness and headache.   Psychiatric/Behavioral: Negative for behavioral problems, decreased concentration, sleep disturbance, suicidal ideas and depressed mood. The patient is nervous/anxious. The patient does not have insomnia.        Objective   Physical Exam  Vitals reviewed.   Constitutional:       General: She is not in acute distress.     Appearance: She is well-developed.   Neck:      Thyroid: No thyromegaly.   Cardiovascular:      Rate and Rhythm: Normal rate.   Pulmonary:      Effort: Pulmonary effort is normal.      Breath sounds: Normal breath sounds. No wheezing.   Abdominal:      General: Bowel sounds are normal.      Palpations: Abdomen is soft.      Tenderness: There is no abdominal tenderness.    Musculoskeletal:         General: Normal range of motion.      Cervical back: Normal range of motion and neck supple.   Neurological:      Mental Status: She is alert and oriented to person, place, and time.   Psychiatric:         Mood and Affect: Mood normal.         Behavior: Behavior normal.       Vitals:    07/06/21 1406   BP: 113/78   Pulse: 89   Temp: 97.1 °F (36.2 °C)   SpO2: 100%     Current Outpatient Medications on File Prior to Visit   Medication Sig Dispense Refill   • amoxicillin (AMOXIL) 500 MG capsule Take 1 capsule by mouth 3 (Three) Times a Day.     • HYDROcodone-acetaminophen (NORCO) 5-325 MG per tablet Take 1 tablet by mouth See Admin Instructions. Take 1 tablet by mouth every 4-6 hours as needed for pain     • cyclobenzaprine (FLEXERIL) 10 MG tablet Take 1 tablet by mouth 3 (Three) Times a Day As Needed for Muscle Spasms. 20 tablet 0   • levonorgestrel-ethinyl estradiol (Introvale) 0.15-0.03 MG per tablet Take 1 tablet by mouth Daily. 94 tablet 4   • Multiple Vitamins-Minerals (MULTIVITAMIN ADULT PO) Take 1 tablet by mouth Daily. Do not take after 3/11 for surgery     • [DISCONTINUED] amphetamine-dextroamphetamine (ADDERALL) 20 MG tablet Take 1 tablet by mouth 2 (Two) Times a Day. 60 tablet 0     No current facility-administered medications on file prior to visit.           Assessment/Plan   Problems Addressed this Visit        Mental Health    ADHD - Primary     Psychological condition is improving with treatment.  Continue current dose of Adderall.  Refill medication.  Regular aerobic exercise.  Psychological condition  will be reassessed in 3 months.         Relevant Medications    citalopram (CeleXA) 10 MG tablet    amphetamine-dextroamphetamine (ADDERALL) 20 MG tablet    Anxiety     Anxiety is worsening discussed  medications.  Rx citalopram 10 mg p.o. daily.  The patient verified not suicidal at this time, she will call our office if there is  any worsening of Sx or thoughts of doing   harm arise.               Diagnoses       Codes Comments    Attention deficit hyperactivity disorder (ADHD), unspecified ADHD type    -  Primary ICD-10-CM: F90.9  ICD-9-CM: 314.01     Anxiety     ICD-10-CM: F41.9  ICD-9-CM: 300.00

## 2021-07-06 NOTE — ASSESSMENT & PLAN NOTE
Anxiety is worsening discussed  medications.  Rx citalopram 10 mg p.o. daily.  The patient verified not suicidal at this time, she will call our office if there is  any worsening of Sx or thoughts of doing  harm arise.

## 2021-08-06 DIAGNOSIS — F90.9 ATTENTION DEFICIT HYPERACTIVITY DISORDER (ADHD), UNSPECIFIED ADHD TYPE: ICD-10-CM

## 2021-08-06 RX ORDER — DEXTROAMPHETAMINE SACCHARATE, AMPHETAMINE ASPARTATE, DEXTROAMPHETAMINE SULFATE AND AMPHETAMINE SULFATE 5; 5; 5; 5 MG/1; MG/1; MG/1; MG/1
20 TABLET ORAL 2 TIMES DAILY
Qty: 60 TABLET | Refills: 0 | Status: SHIPPED | OUTPATIENT
Start: 2021-08-06 | End: 2021-09-07 | Stop reason: SDUPTHER

## 2021-08-06 NOTE — TELEPHONE ENCOUNTER
Caller: Sakina Langley    Relationship: Self    Best call back number: [phone]     Medication needed:   Requested Prescriptions     Pending Prescriptions Disp Refills   • amphetamine-dextroamphetamine (ADDERALL) 20 MG tablet 60 tablet 0     Sig: Take 1 tablet by mouth 2 (Two) Times a Day.       When do you need the refill by: 08/07/2021    What additional details did the patient provide when requesting the medication: THE PATIENT WILL BE OUT OF THIS MEDICATION AFTER TODAY.    Does the patient have less than a 3 day supply:  [x] Yes  [] No    What is the patient's preferred pharmacy: [facility] #[id number] - [place], [place] - [address] AT [facility] [address] [facility] - [phone] [facility] [phone] FX

## 2021-09-07 DIAGNOSIS — F90.9 ATTENTION DEFICIT HYPERACTIVITY DISORDER (ADHD), UNSPECIFIED ADHD TYPE: ICD-10-CM

## 2021-09-07 RX ORDER — DEXTROAMPHETAMINE SACCHARATE, AMPHETAMINE ASPARTATE, DEXTROAMPHETAMINE SULFATE AND AMPHETAMINE SULFATE 5; 5; 5; 5 MG/1; MG/1; MG/1; MG/1
20 TABLET ORAL 2 TIMES DAILY
Qty: 60 TABLET | Refills: 0 | Status: SHIPPED | OUTPATIENT
Start: 2021-09-07 | End: 2021-10-13 | Stop reason: SDUPTHER

## 2021-09-07 NOTE — TELEPHONE ENCOUNTER
Caller: Sakina Langley    Relationship: Self    Best call back number: [phone]    Medication needed:   Requested Prescriptions     Pending Prescriptions Disp Refills   • amphetamine-dextroamphetamine (ADDERALL) 20 MG tablet 60 tablet 0     Sig: Take 1 tablet by mouth 2 (Two) Times a Day.         Does the patient have less than a 3 day supply:  [x] Yes  [] No    What is the patient's preferred pharmacy: [facility] #[id number] - [address] AT [facility] OF [address]  - [phone] [facility] [phone] FX

## 2021-10-07 ENCOUNTER — TELEPHONE (OUTPATIENT)
Dept: OBSTETRICS AND GYNECOLOGY | Facility: CLINIC | Age: 46
End: 2021-10-07

## 2021-10-07 RX ORDER — LEVONORGESTREL AND ETHINYL ESTRADIOL 0.15-0.03
1 KIT ORAL DAILY
Qty: 94 TABLET | Refills: 0 | Status: SHIPPED | OUTPATIENT
Start: 2021-10-07 | End: 2022-01-24

## 2021-10-07 NOTE — TELEPHONE ENCOUNTER
Good afternoon,  Patient is calling to see if provider can refill RX-levonorgestrel-ethinyl estradiol (Introvale) 0.15-0.03 MG per     To bridge until her AE on 1/24.    Please advise,  Thank you      Pharmacy confirmed- [id number] - [place] [facility], [place] - [address] AT [facility] [address]

## 2021-10-13 DIAGNOSIS — F90.9 ATTENTION DEFICIT HYPERACTIVITY DISORDER (ADHD), UNSPECIFIED ADHD TYPE: ICD-10-CM

## 2021-10-13 NOTE — TELEPHONE ENCOUNTER
Caller: Sakina Langley    Relationship: Self      Medication requested (name and dosage): amphetamine-dextroamphetamine (ADDERALL) 20 MG tablet    Pharmacy where [facility] be sent: [facility]  [address]  [phone]    Additional details provided by patient: PATIENT IS OUT OF THIS MEDICATION    Best call back number:     Does the patient have less than a 3 day supply:  [x] Yes  [] No    Ab Ndiaye Rep   10/13/21 14:23 EDT

## 2021-10-14 RX ORDER — DEXTROAMPHETAMINE SACCHARATE, AMPHETAMINE ASPARTATE, DEXTROAMPHETAMINE SULFATE AND AMPHETAMINE SULFATE 5; 5; 5; 5 MG/1; MG/1; MG/1; MG/1
20 TABLET ORAL 2 TIMES DAILY
Qty: 60 TABLET | Refills: 0 | Status: SHIPPED | OUTPATIENT
Start: 2021-10-14 | End: 2021-12-13 | Stop reason: SDUPTHER

## 2021-12-13 ENCOUNTER — OFFICE VISIT (OUTPATIENT)
Dept: FAMILY MEDICINE CLINIC | Facility: CLINIC | Age: 46
End: 2021-12-13

## 2021-12-13 VITALS
BODY MASS INDEX: 23.25 KG/M2 | HEIGHT: 63 IN | TEMPERATURE: 97.7 F | WEIGHT: 131.2 LBS | DIASTOLIC BLOOD PRESSURE: 82 MMHG | OXYGEN SATURATION: 99 % | SYSTOLIC BLOOD PRESSURE: 116 MMHG | HEART RATE: 94 BPM

## 2021-12-13 DIAGNOSIS — F90.9 ATTENTION DEFICIT HYPERACTIVITY DISORDER (ADHD), UNSPECIFIED ADHD TYPE: Primary | ICD-10-CM

## 2021-12-13 DIAGNOSIS — F41.9 ANXIETY: ICD-10-CM

## 2021-12-13 PROCEDURE — 99213 OFFICE O/P EST LOW 20 MIN: CPT | Performed by: FAMILY MEDICINE

## 2021-12-13 RX ORDER — DEXTROAMPHETAMINE SACCHARATE, AMPHETAMINE ASPARTATE, DEXTROAMPHETAMINE SULFATE AND AMPHETAMINE SULFATE 5; 5; 5; 5 MG/1; MG/1; MG/1; MG/1
20 TABLET ORAL 2 TIMES DAILY
Qty: 60 TABLET | Refills: 0 | Status: SHIPPED | OUTPATIENT
Start: 2021-12-13 | End: 2022-01-13 | Stop reason: SDUPTHER

## 2021-12-13 NOTE — PROGRESS NOTES
Subjective   Sakina Langley is a 46 y.o. female.     History of Present Illness   The patient present  for 3 months f/u on ADD, anxiety and med refill. She is taking Adderall and tolerating well. She he is complaining of anxiety and stress but denies difficulity concentration, focusing, behavioral changes and  insomnia.        The following portions of the patient's history were reviewed and updated as appropriate: past medical history, past social history, past surgical history and problem list.    Review of Systems   Constitutional: Negative for activity change and appetite change.   Cardiovascular: Negative for chest pain and palpitations.   Neurological: Negative for headache.   Psychiatric/Behavioral: Negative for decreased concentration, sleep disturbance and depressed mood. The patient is not nervous/anxious.        Objective   Physical Exam  Vitals reviewed.   Cardiovascular:      Heart sounds: Normal heart sounds.   Pulmonary:      Effort: Pulmonary effort is normal.      Breath sounds: No wheezing.   Musculoskeletal:         General: Normal range of motion.   Neurological:      Mental Status: She is alert and oriented to person, place, and time.   Psychiatric:         Mood and Affect: Mood normal.         Behavior: Behavior normal.       Vitals:    12/13/21 1540   BP: 116/82   Pulse: 94   Temp: 97.7 °F (36.5 °C)   SpO2: 99%     Current Outpatient Medications on File Prior to Visit   Medication Sig Dispense Refill   • cyclobenzaprine (FLEXERIL) 10 MG tablet Take 1 tablet by mouth 3 (Three) Times a Day As Needed for Muscle Spasms. 20 tablet 0   • HYDROcodone-acetaminophen (NORCO) 5-325 MG per tablet Take 1 tablet by mouth See Admin Instructions. Take 1 tablet by mouth every 4-6 hours as needed for pain     • levonorgestrel-ethinyl estradiol (Introvale) 0.15-0.03 MG per tablet Take 1 tablet by mouth Daily. 94 tablet 0   • Multiple Vitamins-Minerals (MULTIVITAMIN ADULT PO) Take 1 tablet by mouth Daily. Do not  take after 3/11 for surgery       No current facility-administered medications on file prior to visit.           Assessment/Plan   Problems Addressed this Visit        Mental Health    ADHD - Primary     ADD symptoms are stable continue current dose of Adderall.         Relevant Medications    amphetamine-dextroamphetamine (ADDERALL) 20 MG tablet    Anxiety     Anxiety symptoms are unchanged she has tried citalopram was unable to tolerate and stopped.  She refused further anxiety medications at this time.  Discussed lifestyle changes and exercise.           Diagnoses       Codes Comments    Attention deficit hyperactivity disorder (ADHD), unspecified ADHD type    -  Primary ICD-10-CM: F90.9  ICD-9-CM: 314.01     Anxiety     ICD-10-CM: F41.9  ICD-9-CM: 300.00

## 2021-12-14 NOTE — ASSESSMENT & PLAN NOTE
Anxiety symptoms are unchanged she has tried citalopram was unable to tolerate and stopped.  She refused further anxiety medications at this time.  Discussed lifestyle changes and exercise.

## 2022-01-13 ENCOUNTER — TELEPHONE (OUTPATIENT)
Dept: FAMILY MEDICINE CLINIC | Facility: CLINIC | Age: 47
End: 2022-01-13

## 2022-01-13 DIAGNOSIS — F90.9 ATTENTION DEFICIT HYPERACTIVITY DISORDER (ADHD), UNSPECIFIED ADHD TYPE: ICD-10-CM

## 2022-01-13 RX ORDER — DEXTROAMPHETAMINE SACCHARATE, AMPHETAMINE ASPARTATE, DEXTROAMPHETAMINE SULFATE AND AMPHETAMINE SULFATE 5; 5; 5; 5 MG/1; MG/1; MG/1; MG/1
20 TABLET ORAL 2 TIMES DAILY
Qty: 60 TABLET | Refills: 0 | Status: SHIPPED | OUTPATIENT
Start: 2022-01-13 | End: 2022-02-14 | Stop reason: SDUPTHER

## 2022-01-13 NOTE — TELEPHONE ENCOUNTER
Caller: Sakina Langley  Relationship: Self    Best call back number: [phone]    Requested Prescriptions:   Requested Prescriptions     Pending Prescriptions Disp Refills   • amphetamine-dextroamphetamine (ADDERALL) 20 MG tablet 60 tablet 0     Sig: Take 1 tablet by mouth 2 (Two) Times a Day.        Pharmacy where request should be sent: [facility] #[id number] - [place], [place] - [address] AT [facility] [address] [facility] - [phone] [facility] [phone] FX     Additional details provided by patient: 2 LEFT  Does the patient have less than a 3 day supply:  [x] Yes  [] No    Ab Barrios Rep   01/13/22 13:19 EST

## 2022-01-24 ENCOUNTER — OFFICE VISIT (OUTPATIENT)
Dept: OBSTETRICS AND GYNECOLOGY | Facility: CLINIC | Age: 47
End: 2022-01-24

## 2022-01-24 VITALS
HEART RATE: 92 BPM | BODY MASS INDEX: 22.75 KG/M2 | HEIGHT: 63 IN | DIASTOLIC BLOOD PRESSURE: 75 MMHG | WEIGHT: 128.4 LBS | SYSTOLIC BLOOD PRESSURE: 114 MMHG

## 2022-01-24 DIAGNOSIS — Z01.419 WELL WOMAN EXAM WITH ROUTINE GYNECOLOGICAL EXAM: Primary | ICD-10-CM

## 2022-01-24 PROCEDURE — 99396 PREV VISIT EST AGE 40-64: CPT | Performed by: OBSTETRICS & GYNECOLOGY

## 2022-01-24 NOTE — PROGRESS NOTES
Subjective   Sakina Langley is a 46 y.o. female   Chief Complaint   Patient presents with   • Gynecologic Exam     Patient here for AE; last pap 2017-neg;Mammo-? years ago      Gynecologic Exam  The patient's pertinent negatives include no pelvic pain or vaginal discharge. Pertinent negatives include no abdominal pain, chills, constipation, diarrhea, dysuria, fever, frequency, headaches, hematuria, nausea, rash, sore throat, urgency or vomiting.     Sakina Langley is a 46 y.o.  who presents for an annual examination.       Pap history:  Last pap: 2017 NIL, HPV negative  Prior abnormal paps: no  STDs  Sexually active: no  History of STDs: no  Contraception:  OCP, desires to continue  Mammogram: has had one after implants, declines for now secondary to COVID pandemic  Colonoscopy - never      Screening for BRCA-   Is patient's family history significant for any of the following?   Yes, prior genetic testing was negative  For non-Ash[employer]azi [employer] women:   Two first-degree relatives with breast cancer, one of whom was diagnosed at age 50 or younger   A combination of three or more first or second-degree relatives with breast cancer regardless of age at diagnosis   A combination of both breast and ovarian cancer among first and second-degree relatives   A first-degree relative with bilateral breast cancer   A combination of two or more first or second degree relatives with ovarian cancer, regardless of age at diagnosis   A first or second-degree relative with both breast and ovarian cancer at any age   History of breast cancer in a male relative   For women of Ashkenazi [employer] descent:   Any first-degree relative (or two second degree relatives on the same side of the family) with breast or ovarian cancer   Recommendations from the [place] Preventive Services Task Force on who should be offered genetic testing for BRCA mutations*     Past Medical History:   Diagnosis Date   • Abdominal pain, right lower  quadrant    • ADHD    • Cholelithiasis 3/2020   • Encounter for therapeutic drug monitoring    • High risk medication use    • Impacted cerumen of right ear    • Lower back pain    • Nausea    • Otitis media, acute    • PONV (postoperative nausea and vomiting)    • Urticaria    • Warts     MULTIPLE     Past Surgical History:   Procedure Laterality Date   • BREAST AUGMENTATION     •  SECTION     • CHOLECYSTECTOMY N/A 3/17/2020    Procedure: CHOLECYSTECTOMY LAPAROSCOPIC;  Surgeon: Ermelinda Gerardo MD;  Location: [facility] MAIN OR;  Service: General;  Laterality: N/A;   • DIAGNOSTIC LAPAROSCOPY     • MOUTH SURGERY      Pallet of mouth   • TONSILLECTOMY       Social History     Tobacco Use   • Smoking status: Never Smoker   • Smokeless tobacco: Never Used   Vaping Use   • Vaping Use: Never used   Substance Use Topics   • Alcohol use: Yes     Comment: Occasional bourbon   • Drug use: Never     Family History   Problem Relation Age of Onset   • Ovarian cancer Mother          in her 30's   • Cancer Mother    • Miscarriages / Stillbirths Mother    • Ovarian cancer Maternal Aunt         unknown    • Lung cancer Maternal Aunt         5 AUNTS-MOMS SIDE    • Lung cancer Other         3 RELATIVES    • Arthritis Paternal Grandfather    • Diabetes Paternal Grandmother    • Hypertension Father    • Breast cancer Neg Hx    • Uterine cancer Neg Hx    • Colon cancer Neg Hx    • Deep vein thrombosis Neg Hx    • Pulmonary embolism Neg Hx      Current Outpatient Medications on File Prior to Visit   Medication Sig Dispense Refill   • amphetamine-dextroamphetamine (ADDERALL) 20 MG tablet Take 1 tablet by mouth 2 (Two) Times a Day. 60 tablet 0   • Multiple Vitamins-Minerals (MULTIVITAMIN ADULT PO) Take 1 tablet by mouth Daily. Do not take after 3/11 for surgery     • [DISCONTINUED] levonorgestrel-ethinyl estradiol (Introvale) 0.15-0.03 MG per tablet Take 1 tablet by mouth Daily. 94 tablet 0   • cyclobenzaprine (FLEXERIL) 10 MG  "tablet Take 1 tablet by mouth 3 (Three) Times a Day As Needed for Muscle Spasms. 20 tablet 0   • HYDROcodone-acetaminophen (NORCO) 5-325 MG per tablet Take 1 tablet by mouth See Admin Instructions. Take 1 tablet by mouth every 4-6 hours as needed for pain       No current facility-administered medications on file prior to visit.     No Known Allergies       Review of Systems   Constitutional: Negative for chills, fever and unexpected weight change.   HENT: Negative for congestion, nosebleeds and sore throat.    Eyes: Negative for visual disturbance.   Respiratory: Negative for cough, chest tightness and shortness of breath.    Cardiovascular: Negative for chest pain and palpitations.   Gastrointestinal: Negative for abdominal pain, constipation, diarrhea, nausea and vomiting.   Endocrine: Negative for polyuria.   Genitourinary: Negative for difficulty urinating, dyspareunia, dysuria, frequency, genital sores, hematuria, menstrual problem, pelvic pain, urgency, vaginal bleeding, vaginal discharge and vaginal pain.   Musculoskeletal: Negative for arthralgias and joint swelling.   Skin: Negative for color change and rash.   Neurological: Negative for dizziness, light-headedness and headaches.   Hematological: Does not bruise/bleed easily.   Psychiatric/Behavioral: Negative for dysphoric mood. The patient is not nervous/anxious.        Objective    /75   Pulse 92   Ht 160 cm (63\")   Wt 58.2 kg (128 lb 6.4 oz)   LMP 01/21/2022 (Exact Date)   Breastfeeding No   BMI 22.75 kg/m²    Physical Exam   Constitutional: She is oriented to person, place, and time. She appears well-developed. No distress.   HENT:   Head: Normocephalic and atraumatic.   Neck: No tracheal deviation present. No thyromegaly present.   Cardiovascular: Normal rate, regular rhythm and normal heart sounds. Exam reveals no gallop and no friction rub.   No murmur heard.  Pulmonary/Chest: Effort normal and breath sounds normal. No respiratory " distress. She has no wheezes. She has no rales. She exhibits no tenderness. Right breast exhibits no inverted nipple, no mass, no nipple discharge, no skin change and no tenderness. Left breast exhibits no inverted nipple, no mass, no nipple discharge, no skin change and no tenderness.   Abdominal: Soft. She exhibits no distension and no mass. There is no abdominal tenderness.   Genitourinary: There is no rash, lesion or injury on the right labia. There is no rash, lesion or injury on the left labia. Uterus is not deviated, not enlarged, not fixed and not tender. Cervix exhibits no motion tenderness, no discharge and no friability. Right adnexum displays no mass, no tenderness and no fullness. Left adnexum displays no mass, no tenderness and no fullness.    No vaginal discharge, tenderness or bleeding.   No tenderness or bleeding in the vagina.   Musculoskeletal: Normal range of motion. No deformity.   Lymphadenopathy:     She has no cervical adenopathy.   Neurological: She is alert and oriented to person, place, and time.   Skin: Skin is warm and dry. No rash noted. She is not diaphoretic.   Psychiatric: Her behavior is normal. Judgment and thought content normal.         Assessment/Plan   Problems Addressed this Visit     None      Visit Diagnoses     Well woman exam with routine gynecological exam    -  Primary    Relevant Orders    IGP, Apt HPV,rfx 16 / 18,45      Diagnoses       Codes Comments    Well woman exam with routine gynecological exam    -  Primary ICD-10-CM: Z01.419  ICD-9-CM: V72.31         Well woman counseling/screening:    Cervical cancer screening:    Reports no h/o cervical dysplasia   The patient is due for a pap in today.    Screening guidelines discussed with patient  Breast cancer screening:    Clinical breast exam recommended for age 20-39 years every 1-3 years   Mammogram recommended starting age 40, Discussed risks and benefits of earlier versus later screening. Patient plans to  schedule   Breast self awareness encouraged  STD Screening   Declines  Contraception :   Would like to continue Quasense  Family history   Has had negative genetic testing given maternal h/o ovarian cancer in mom and maternal aunts    Healthy lifestyle counseling:   Patient was counseled on    Body mass index is 22.75 kg/m².

## 2022-01-27 ENCOUNTER — TELEPHONE (OUTPATIENT)
Dept: OBSTETRICS AND GYNECOLOGY | Facility: CLINIC | Age: 47
End: 2022-01-27

## 2022-01-27 LAB
CYTOLOGIST CVX/VAG CYTO: ABNORMAL
CYTOLOGY CVX/VAG DOC CYTO: ABNORMAL
CYTOLOGY CVX/VAG DOC THIN PREP: ABNORMAL
DX ICD CODE: ABNORMAL
HIV 1 & 2 AB SER-IMP: ABNORMAL
HPV I/H RISK 4 DNA CVX QL PROBE+SIG AMP: POSITIVE
HPV16 DNA CVX QL PROBE+SIG AMP: POSITIVE
HPV18+45 E6+E7 MRNA CVX QL NAA+PROBE: NEGATIVE
OTHER STN SPEC: ABNORMAL
STAT OF ADQ CVX/VAG CYTO-IMP: ABNORMAL

## 2022-01-27 NOTE — TELEPHONE ENCOUNTER
Called patient to review pap smear which showed normal cytology but HPV 16 +. In this case, we recommend a colposcopy.      No answer, left general VM.  Please reach out to patient again, thanks!

## 2022-02-14 ENCOUNTER — TELEPHONE (OUTPATIENT)
Dept: FAMILY MEDICINE CLINIC | Facility: CLINIC | Age: 47
End: 2022-02-14

## 2022-02-14 DIAGNOSIS — F90.9 ATTENTION DEFICIT HYPERACTIVITY DISORDER (ADHD), UNSPECIFIED ADHD TYPE: ICD-10-CM

## 2022-02-14 RX ORDER — DEXTROAMPHETAMINE SACCHARATE, AMPHETAMINE ASPARTATE, DEXTROAMPHETAMINE SULFATE AND AMPHETAMINE SULFATE 5; 5; 5; 5 MG/1; MG/1; MG/1; MG/1
20 TABLET ORAL 2 TIMES DAILY
Qty: 60 TABLET | Refills: 0 | Status: SHIPPED | OUTPATIENT
Start: 2022-02-14 | End: 2022-03-15 | Stop reason: SDUPTHER

## 2022-02-14 NOTE — TELEPHONE ENCOUNTER
Caller: Sakina Langley    Relationship: Self    Best call back number: [phone]     Requested Prescriptions:   Requested Prescriptions     Pending Prescriptions Disp Refills   • amphetamine-dextroamphetamine (ADDERALL) 20 MG tablet 60 tablet 0     Sig: Take 1 tablet by mouth 2 (Two) Times a Day.        Pharmacy where request should be sent: [facility] #[id number] - [place], [place] - [address] AT [facility] [address] [facility] - [phone] [facility] [phone] FX         Does the patient have less than a 3 day supply:  [x] Yes  [] No    Ab Lara Rep   02/14/22 14:37 EST

## 2022-03-02 ENCOUNTER — PROCEDURE VISIT (OUTPATIENT)
Dept: OBSTETRICS AND GYNECOLOGY | Facility: CLINIC | Age: 47
End: 2022-03-02

## 2022-03-02 VITALS
WEIGHT: 129.4 LBS | SYSTOLIC BLOOD PRESSURE: 115 MMHG | HEART RATE: 87 BPM | BODY MASS INDEX: 22.93 KG/M2 | DIASTOLIC BLOOD PRESSURE: 76 MMHG | HEIGHT: 63 IN

## 2022-03-02 DIAGNOSIS — Z32.02 ENCOUNTER FOR PREGNANCY TEST WITH RESULT NEGATIVE: ICD-10-CM

## 2022-03-02 DIAGNOSIS — B97.7 HIGH RISK HPV INFECTION: Primary | ICD-10-CM

## 2022-03-02 LAB
B-HCG UR QL: NEGATIVE
EXPIRATION DATE: NORMAL
INTERNAL NEGATIVE CONTROL: NEGATIVE
INTERNAL POSITIVE CONTROL: POSITIVE
Lab: NORMAL

## 2022-03-02 PROCEDURE — 81025 URINE PREGNANCY TEST: CPT | Performed by: OBSTETRICS & GYNECOLOGY

## 2022-03-02 PROCEDURE — 57454 BX/CURETT OF CERVIX W/SCOPE: CPT | Performed by: OBSTETRICS & GYNECOLOGY

## 2022-03-02 NOTE — PROGRESS NOTES
Colposcopy Procedure Note    Indications:   Pap Jan 2022: NIL, HPV 16+  She has not had previous abnormal pap smears. She has not has a conization or LEEP procedure in the past.     History:  Social History     Tobacco Use   Smoking Status Never Smoker   Smokeless Tobacco Never Used     Contraception: oral contraceptive pill     Procedure Details   The risks and benefits of the procedure and Verbal informed consent obtained.  Urine pregnancy test was done and was negative    Speculum placed in vagina and excellent visualization of cervix achieved, cervix swabbed x 3 with acetic acid solution.  360degree visualization of [place] adequate    Findings:  Cervix: acetowhite lesion(s) noted at 11-1 o'clock; endocervical curettage performed, cervical biopsies taken at 12 o'clock, specimen labelled and sent to pathology and hemostasis achieved with Monsel's solution.  Vaginal inspection: vaginal colposcopy not performed.  Vulvar colposcopy: vulvar colposcopy not performed.    Specimens: Biopsy at 12 o'clock, ECC    Complications: none.  Patient tolerated procedure well.    Impression:  HPV changes    Plan:  Specimens labelled and sent to Pathology.  Will base further treatment on Pathology findings.  Post biopsy instructions given to patient.  Patient will be called with results.  Patient was instructed if she is not called in 10 days to call the office to discuss results and follow up.

## 2022-03-02 NOTE — ADDENDUM NOTE
Addended by: HAILEY CALLES on: 3/2/2022 03:31 PM     Modules accepted: Orders    
Labs/Medications/Imaging Studies

## 2022-03-04 ENCOUNTER — TELEPHONE (OUTPATIENT)
Dept: OBSTETRICS AND GYNECOLOGY | Facility: CLINIC | Age: 47
End: 2022-03-04

## 2022-03-04 LAB
DX ICD CODE: NORMAL
DX ICD CODE: NORMAL
PATH REPORT.FINAL DX SPEC: NORMAL
PATH REPORT.GROSS SPEC: NORMAL
PATH REPORT.SITE OF ORIGIN SPEC: NORMAL
PATHOLOGIST NAME: NORMAL
PAYMENT PROCEDURE: NORMAL

## 2022-03-04 NOTE — TELEPHONE ENCOUNTER
Please let patient know that biopsy was low grade which is c/w pap smear. Recommend repeat pap smear in one year.  Thanks!

## 2022-03-08 NOTE — TELEPHONE ENCOUNTER
Patient notified biopsy results consistent with pap results and repeat pap in 1 year. Patient did not have any further questions at this time.

## 2022-03-15 DIAGNOSIS — F90.9 ATTENTION DEFICIT HYPERACTIVITY DISORDER (ADHD), UNSPECIFIED ADHD TYPE: ICD-10-CM

## 2022-03-15 NOTE — TELEPHONE ENCOUNTER
Caller: Sakina Langley    Relationship: Self    Best call back number: [phone]    Requested Prescriptions:   Requested Prescriptions     Pending Prescriptions Disp Refills   • amphetamine-dextroamphetamine (ADDERALL) 20 MG tablet 60 tablet 0     Sig: Take 1 tablet by mouth 2 (Two) Times a Day.        Pharmacy where request should be sent: [facility] #[id number] - [facility] [place] - [address] [facility] AT [facility] [address] [facility] - [phone] [facility] [phone] FX     Additional details provided by patient: PATIENT CALLING IN REGARDS TO REQUEST REFILL WITH 1 LEFT    Does the patient have less than a 3 day supply:  [] Yes  [x] No    Ab Fuller Rep   03/15/22 15:04 EDT

## 2022-03-16 RX ORDER — DEXTROAMPHETAMINE SACCHARATE, AMPHETAMINE ASPARTATE, DEXTROAMPHETAMINE SULFATE AND AMPHETAMINE SULFATE 5; 5; 5; 5 MG/1; MG/1; MG/1; MG/1
20 TABLET ORAL 2 TIMES DAILY
Qty: 60 TABLET | Refills: 0 | Status: SHIPPED | OUTPATIENT
Start: 2022-03-16 | End: 2022-05-05 | Stop reason: SDUPTHER

## 2022-05-05 ENCOUNTER — OFFICE VISIT (OUTPATIENT)
Dept: FAMILY MEDICINE CLINIC | Facility: CLINIC | Age: 47
End: 2022-05-05

## 2022-05-05 VITALS
SYSTOLIC BLOOD PRESSURE: 120 MMHG | HEART RATE: 87 BPM | OXYGEN SATURATION: 99 % | HEIGHT: 63 IN | TEMPERATURE: 98.4 F | WEIGHT: 127 LBS | BODY MASS INDEX: 22.5 KG/M2 | DIASTOLIC BLOOD PRESSURE: 82 MMHG

## 2022-05-05 DIAGNOSIS — F90.9 ATTENTION DEFICIT HYPERACTIVITY DISORDER (ADHD), UNSPECIFIED ADHD TYPE: Primary | ICD-10-CM

## 2022-05-05 DIAGNOSIS — F41.9 ANXIETY: ICD-10-CM

## 2022-05-05 PROCEDURE — 99213 OFFICE O/P EST LOW 20 MIN: CPT | Performed by: FAMILY MEDICINE

## 2022-05-05 RX ORDER — DEXTROAMPHETAMINE SACCHARATE, AMPHETAMINE ASPARTATE, DEXTROAMPHETAMINE SULFATE AND AMPHETAMINE SULFATE 5; 5; 5; 5 MG/1; MG/1; MG/1; MG/1
20 TABLET ORAL 2 TIMES DAILY
Qty: 60 TABLET | Refills: 0 | Status: SHIPPED | OUTPATIENT
Start: 2022-05-05 | End: 2022-06-14 | Stop reason: SDUPTHER

## 2022-05-05 NOTE — PROGRESS NOTES
Subjective   Sakina Langley is a 46 y.o. female.     History of Present Illness     The patient present  for 3 months f/u on ADD, anxiety and med refill. She is doing well, tolerating adderall well , denies difficulity concentration, focusing, anxiety, depression, and  insomnia.        The following portions of the patient's history were reviewed and updated as appropriate: past medical history, past social history, past surgical history and problem list.    Review of Systems   Constitutional: Negative for fatigue.   Cardiovascular: Negative for chest pain and palpitations.   Neurological: Negative for headache.   Psychiatric/Behavioral: Negative for agitation, behavioral problems, decreased concentration, sleep disturbance and depressed mood. The patient is not nervous/anxious.        Objective   Physical Exam  Vitals reviewed.   Constitutional:       Appearance: She is well-developed.   Neck:      Thyroid: No thyromegaly.   Pulmonary:      Breath sounds: Normal breath sounds. No wheezing.   Musculoskeletal:         General: Normal range of motion.      Cervical back: Normal range of motion and neck supple.   Neurological:      Mental Status: She is alert and oriented to person, place, and time.   Psychiatric:         Mood and Affect: Mood normal.         Behavior: Behavior normal.       Vitals:    05/05/22 1047   BP: 120/82   Pulse: 87   Temp: 98.4 °F (36.9 °C)   SpO2: 99%     Current Outpatient Medications on File Prior to Visit   Medication Sig Dispense Refill   • Quasense 0.15-0.03 MG per tablet Take 1 tablet by mouth Daily. 91 tablet 4     No current facility-administered medications on file prior to visit.           Assessment/Plan   Problems Addressed this Visit        Mental Health    ADHD - Primary     Psychological condition is improving with treatment.  Continue current treatment regimen.  Regular aerobic exercise.  Psychological condition  will be reassessed in 3 months.           Relevant Medications     amphetamine-dextroamphetamine (ADDERALL) 20 MG tablet    Anxiety     Stable - discussed lifestyle modification and exercise.             Diagnoses       Codes Comments    Attention deficit hyperactivity disorder (ADHD), unspecified ADHD type    -  Primary ICD-10-CM: F90.9  ICD-9-CM: 314.01     Anxiety     ICD-10-CM: F41.9  ICD-9-CM: 300.00

## 2022-06-14 DIAGNOSIS — F90.9 ATTENTION DEFICIT HYPERACTIVITY DISORDER (ADHD), UNSPECIFIED ADHD TYPE: ICD-10-CM

## 2022-06-14 RX ORDER — DEXTROAMPHETAMINE SACCHARATE, AMPHETAMINE ASPARTATE, DEXTROAMPHETAMINE SULFATE AND AMPHETAMINE SULFATE 5; 5; 5; 5 MG/1; MG/1; MG/1; MG/1
20 TABLET ORAL 2 TIMES DAILY
Qty: 60 TABLET | Refills: 0 | Status: SHIPPED | OUTPATIENT
Start: 2022-06-14 | End: 2022-07-15 | Stop reason: SDUPTHER

## 2022-06-14 NOTE — TELEPHONE ENCOUNTER
Caller: Sakina Langley    Relationship: Self    Best call back number: [phone]    Requested Prescriptions:       amphetamine-dextroamphetamine (ADDERALL) 20 MG tablet         Pharmacy where request should be sent:    [facility] #[id number] - [place], [place] - [address] [place] [facility] [address]  - [phone]  - [phone] FX  [phone]      Does the patient have less than a 3 day supply:  [x] Yes  [] No    Corazon Fonseca, RedSched Rep   06/14/22 11:22 EDT     PLEASE ADVISE.

## 2022-07-15 DIAGNOSIS — F90.9 ATTENTION DEFICIT HYPERACTIVITY DISORDER (ADHD), UNSPECIFIED ADHD TYPE: ICD-10-CM

## 2022-07-15 RX ORDER — DEXTROAMPHETAMINE SACCHARATE, AMPHETAMINE ASPARTATE, DEXTROAMPHETAMINE SULFATE AND AMPHETAMINE SULFATE 5; 5; 5; 5 MG/1; MG/1; MG/1; MG/1
20 TABLET ORAL 2 TIMES DAILY
Qty: 60 TABLET | Refills: 0 | Status: SHIPPED | OUTPATIENT
Start: 2022-07-15 | End: 2022-08-15 | Stop reason: SDUPTHER

## 2022-07-15 NOTE — TELEPHONE ENCOUNTER
Caller: Sakina Langley    Relationship: Self    Best call back number: [phone]    Requested Prescriptions:   Requested Prescriptions     Pending Prescriptions Disp Refills   • amphetamine-dextroamphetamine (ADDERALL) 20 MG tablet 60 tablet 0     Sig: Take 1 tablet by mouth 2 (Two) Times a Day.        Pharmacy where request should be sent: [id number] - [place], [place] - [address] [facility] AT [facility] [address] [facility] - [phone]  - [phone] FX     Additional details provided by patient: PATIENT STATES SHE HAS 1 DAY OF MEDICATION REMAINING    Does the patient have less than a 3 day supply:  [x] Yes  [] No    Shaunna Richard   07/15/22 08:42 EDT

## 2022-08-15 DIAGNOSIS — F90.9 ATTENTION DEFICIT HYPERACTIVITY DISORDER (ADHD), UNSPECIFIED ADHD TYPE: ICD-10-CM

## 2022-08-15 NOTE — TELEPHONE ENCOUNTER
Caller: Sakina Langley    Relationship: Self    Best call back number: [phone]    Requested Prescriptions:   Requested Prescriptions     Pending Prescriptions Disp Refills   • amphetamine-dextroamphetamine (ADDERALL) 20 MG tablet 60 tablet 0     Sig: Take 1 tablet by mouth 2 (Two) Times a Day.        Pharmacy where request should be sent: [facility][id number] - [facility] [place] - [address] [facility] [facility] [address] AT [facility] [address] [facility] - [phone] [facility] [phone] FX     Additional details provided by patient: PT HAS 1 DAY LEFT OF MEDICATION.     Does the patient have less than a 3 day supply:  [x] Yes  [] No    Ab Mccollum Rep   08/15/22 10:53 EDT

## 2022-08-16 RX ORDER — DEXTROAMPHETAMINE SACCHARATE, AMPHETAMINE ASPARTATE, DEXTROAMPHETAMINE SULFATE AND AMPHETAMINE SULFATE 5; 5; 5; 5 MG/1; MG/1; MG/1; MG/1
20 TABLET ORAL 2 TIMES DAILY
Qty: 60 TABLET | Refills: 0 | Status: SHIPPED | OUTPATIENT
Start: 2022-08-16 | End: 2022-09-23 | Stop reason: SDUPTHER

## 2022-08-16 NOTE — TELEPHONE ENCOUNTER
Patient notified via voicemail . Advised to call back if she has any questions or concerns on 8/16 @ 9:02AM .

## 2022-09-23 ENCOUNTER — OFFICE VISIT (OUTPATIENT)
Dept: FAMILY MEDICINE CLINIC | Facility: CLINIC | Age: 47
End: 2022-09-23

## 2022-09-23 VITALS
HEIGHT: 63 IN | HEART RATE: 75 BPM | BODY MASS INDEX: 23.32 KG/M2 | SYSTOLIC BLOOD PRESSURE: 129 MMHG | WEIGHT: 131.6 LBS | DIASTOLIC BLOOD PRESSURE: 87 MMHG | OXYGEN SATURATION: 99 % | TEMPERATURE: 98.4 F | RESPIRATION RATE: 16 BRPM

## 2022-09-23 DIAGNOSIS — F41.9 ANXIETY: ICD-10-CM

## 2022-09-23 DIAGNOSIS — F90.9 ATTENTION DEFICIT HYPERACTIVITY DISORDER (ADHD), UNSPECIFIED ADHD TYPE: Primary | ICD-10-CM

## 2022-09-23 DIAGNOSIS — F90.9 ATTENTION DEFICIT HYPERACTIVITY DISORDER (ADHD), UNSPECIFIED ADHD TYPE: ICD-10-CM

## 2022-09-23 PROCEDURE — 99213 OFFICE O/P EST LOW 20 MIN: CPT | Performed by: FAMILY MEDICINE

## 2022-09-23 NOTE — PROGRESS NOTES
Subjective   Sakina Langley is a 47 y.o. female.     History of Present Illness     The patient present  for 3 months f/u on ADD and med refill. She is doing well denies difficulity concentration, focusing, anxiety, depression and insomnia.  She is taking adderall and tolerating well.       The following portions of the patient's history were reviewed and updated as appropriate: past medical history, past social history, past surgical history and problem list.    Review of Systems   Constitutional: Negative for fatigue.   Cardiovascular: Negative for palpitations.   Gastrointestinal: Positive for abdominal distention. Negative for indigestion.   Psychiatric/Behavioral: Negative for decreased concentration, sleep disturbance and depressed mood. The patient is not nervous/anxious.        Objective   Physical Exam  Vitals reviewed.   Constitutional:       Appearance: She is well-developed.   Neck:      Thyroid: No thyromegaly.   Pulmonary:      Effort: Pulmonary effort is normal.   Abdominal:      Palpations: Abdomen is soft.      Tenderness: There is no abdominal tenderness.   Neurological:      Mental Status: She is alert and oriented to person, place, and time.   Psychiatric:         Mood and Affect: Mood normal.       Vitals:    09/23/22 1417   BP: 129/87   Pulse: 75   Resp: 16   Temp: 98.4 °F (36.9 °C)   SpO2: 99%     No current outpatient medications on file prior to visit.     No current facility-administered medications on file prior to visit.           Assessment & Plan   Problems Addressed this Visit        Mental Health    ADHD - Primary     Psychological condition is improving with treatment.  Continue adderall.  Regular aerobic exercise.  Psychological condition  will be reassessed in 3 months.         Relevant Medications    amphetamine-dextroamphetamine (ADDERALL) 20 MG tablet    Anxiety     Stable.           Diagnoses       Codes Comments    Attention deficit hyperactivity disorder (ADHD), unspecified  ADHD type    -  Primary ICD-10-CM: F90.9  ICD-9-CM: 314.01     Anxiety     ICD-10-CM: F41.9  ICD-9-CM: 300.00

## 2022-09-24 RX ORDER — DEXTROAMPHETAMINE SACCHARATE, AMPHETAMINE ASPARTATE, DEXTROAMPHETAMINE SULFATE AND AMPHETAMINE SULFATE 5; 5; 5; 5 MG/1; MG/1; MG/1; MG/1
20 TABLET ORAL 2 TIMES DAILY
Qty: 60 TABLET | Refills: 0 | Status: SHIPPED | OUTPATIENT
Start: 2022-09-24 | End: 2022-10-27 | Stop reason: SDUPTHER

## 2022-09-24 RX ORDER — DEXTROAMPHETAMINE SACCHARATE, AMPHETAMINE ASPARTATE, DEXTROAMPHETAMINE SULFATE AND AMPHETAMINE SULFATE 5; 5; 5; 5 MG/1; MG/1; MG/1; MG/1
20 TABLET ORAL 2 TIMES DAILY
Qty: 60 TABLET | Refills: 0 | Status: SHIPPED | OUTPATIENT
Start: 2022-09-24 | End: 2023-02-06 | Stop reason: SDUPTHER

## 2022-09-24 NOTE — ASSESSMENT & PLAN NOTE
Psychological condition is improving with treatment.  Continue adderall.  Regular aerobic exercise.  Psychological condition  will be reassessed in 3 months.

## 2022-10-27 DIAGNOSIS — F90.9 ATTENTION DEFICIT HYPERACTIVITY DISORDER (ADHD), UNSPECIFIED ADHD TYPE: ICD-10-CM

## 2022-10-27 RX ORDER — DEXTROAMPHETAMINE SACCHARATE, AMPHETAMINE ASPARTATE, DEXTROAMPHETAMINE SULFATE AND AMPHETAMINE SULFATE 5; 5; 5; 5 MG/1; MG/1; MG/1; MG/1
20 TABLET ORAL 2 TIMES DAILY
Qty: 60 TABLET | Refills: 0 | Status: SHIPPED | OUTPATIENT
Start: 2022-10-27 | End: 2022-11-29 | Stop reason: SDUPTHER

## 2022-10-27 NOTE — TELEPHONE ENCOUNTER
Caller: Sakina Langley    Relationship: Self    Best call back number: [phone]    Requested Prescriptions:   Requested Prescriptions     Pending Prescriptions Disp Refills   • amphetamine-dextroamphetamine (ADDERALL) 20 MG tablet 60 tablet 0     Sig: Take 1 tablet by mouth 2 (Two) Times a Day.        Pharmacy where request should be sent: [id number] - [facility] [place] - [address] AT [facility] OF [address] [facility] - [phone] [facility] [phone] FX     Additional details provided by patient: PATIENT IS OUT OF THIS MEDICATION    Does the patient have less than a 3 day supply:  [x] Yes  [] No    Ab Keller Rep   10/27/22 11:12 EDT

## 2022-11-29 DIAGNOSIS — F90.9 ATTENTION DEFICIT HYPERACTIVITY DISORDER (ADHD), UNSPECIFIED ADHD TYPE: ICD-10-CM

## 2022-11-29 RX ORDER — DEXTROAMPHETAMINE SACCHARATE, AMPHETAMINE ASPARTATE, DEXTROAMPHETAMINE SULFATE AND AMPHETAMINE SULFATE 5; 5; 5; 5 MG/1; MG/1; MG/1; MG/1
20 TABLET ORAL 2 TIMES DAILY
Qty: 60 TABLET | Refills: 0 | Status: SHIPPED | OUTPATIENT
Start: 2022-11-29 | End: 2023-01-03 | Stop reason: SDUPTHER

## 2023-01-03 DIAGNOSIS — F90.9 ATTENTION DEFICIT HYPERACTIVITY DISORDER (ADHD), UNSPECIFIED ADHD TYPE: ICD-10-CM

## 2023-01-03 RX ORDER — DEXTROAMPHETAMINE SACCHARATE, AMPHETAMINE ASPARTATE, DEXTROAMPHETAMINE SULFATE AND AMPHETAMINE SULFATE 5; 5; 5; 5 MG/1; MG/1; MG/1; MG/1
20 TABLET ORAL 2 TIMES DAILY
Qty: 60 TABLET | Refills: 0 | Status: SHIPPED | OUTPATIENT
Start: 2023-01-03 | End: 2023-01-10

## 2023-01-10 ENCOUNTER — LAB (OUTPATIENT)
Dept: FAMILY MEDICINE CLINIC | Facility: CLINIC | Age: 48
End: 2023-01-10
Payer: COMMERCIAL

## 2023-01-10 ENCOUNTER — OFFICE VISIT (OUTPATIENT)
Dept: FAMILY MEDICINE CLINIC | Facility: CLINIC | Age: 48
End: 2023-01-10
Payer: COMMERCIAL

## 2023-01-10 VITALS
WEIGHT: 135 LBS | HEIGHT: 63 IN | DIASTOLIC BLOOD PRESSURE: 72 MMHG | SYSTOLIC BLOOD PRESSURE: 121 MMHG | BODY MASS INDEX: 23.92 KG/M2 | HEART RATE: 88 BPM | OXYGEN SATURATION: 98 % | RESPIRATION RATE: 16 BRPM | TEMPERATURE: 97.7 F

## 2023-01-10 DIAGNOSIS — Z80.0 FAMILY HISTORY OF COLON CANCER IN MOTHER: ICD-10-CM

## 2023-01-10 DIAGNOSIS — F90.9 ATTENTION DEFICIT HYPERACTIVITY DISORDER (ADHD), UNSPECIFIED ADHD TYPE: ICD-10-CM

## 2023-01-10 DIAGNOSIS — Z12.11 COLON CANCER SCREENING: ICD-10-CM

## 2023-01-10 DIAGNOSIS — Z00.00 ENCOUNTER FOR WELL ADULT EXAM WITHOUT ABNORMAL FINDINGS: Primary | ICD-10-CM

## 2023-01-10 LAB
ALBUMIN SERPL-MCNC: 4.4 G/DL (ref 3.5–5.2)
ALBUMIN/GLOB SERPL: 1.6 G/DL
ALP SERPL-CCNC: 52 U/L (ref 39–117)
ALT SERPL W P-5'-P-CCNC: 10 U/L (ref 1–33)
ANION GAP SERPL CALCULATED.3IONS-SCNC: 11.4 MMOL/L (ref 5–15)
AST SERPL-CCNC: 15 U/L (ref 1–32)
BASOPHILS # BLD AUTO: 0.03 10*3/MM3 (ref 0–0.2)
BASOPHILS NFR BLD AUTO: 0.4 % (ref 0–1.5)
BILIRUB SERPL-MCNC: 0.3 MG/DL (ref 0–1.2)
BUN SERPL-MCNC: 12 MG/DL (ref 6–20)
BUN/CREAT SERPL: 14.3 (ref 7–25)
CALCIUM SPEC-SCNC: 9.6 MG/DL (ref 8.6–10.5)
CHLORIDE SERPL-SCNC: 105 MMOL/L (ref 98–107)
CHOLEST SERPL-MCNC: 157 MG/DL (ref 0–200)
CO2 SERPL-SCNC: 24.6 MMOL/L (ref 22–29)
CREAT SERPL-MCNC: 0.84 MG/DL (ref 0.57–1)
DEPRECATED RDW RBC AUTO: 39.6 FL (ref 37–54)
EGFRCR SERPLBLD CKD-EPI 2021: 87.5 ML/MIN/1.73
EOSINOPHIL # BLD AUTO: 0.13 10*3/MM3 (ref 0–0.4)
EOSINOPHIL NFR BLD AUTO: 1.6 % (ref 0.3–6.2)
ERYTHROCYTE [DISTWIDTH] IN BLOOD BY AUTOMATED COUNT: 12.4 % (ref 12.3–15.4)
GLOBULIN UR ELPH-MCNC: 2.7 GM/DL
GLUCOSE SERPL-MCNC: 88 MG/DL (ref 65–99)
HCT VFR BLD AUTO: 40.4 % (ref 34–46.6)
HDLC SERPL-MCNC: 56 MG/DL (ref 40–60)
HGB BLD-MCNC: 13.6 G/DL (ref 12–15.9)
IMM GRANULOCYTES # BLD AUTO: 0.02 10*3/MM3 (ref 0–0.05)
IMM GRANULOCYTES NFR BLD AUTO: 0.2 % (ref 0–0.5)
LDLC SERPL CALC-MCNC: 89 MG/DL (ref 0–100)
LDLC/HDLC SERPL: 1.6 {RATIO}
LYMPHOCYTES # BLD AUTO: 2.05 10*3/MM3 (ref 0.7–3.1)
LYMPHOCYTES NFR BLD AUTO: 25.5 % (ref 19.6–45.3)
MCH RBC QN AUTO: 29.4 PG (ref 26.6–33)
MCHC RBC AUTO-ENTMCNC: 33.7 G/DL (ref 31.5–35.7)
MCV RBC AUTO: 87.3 FL (ref 79–97)
MONOCYTES # BLD AUTO: 0.49 10*3/MM3 (ref 0.1–0.9)
MONOCYTES NFR BLD AUTO: 6.1 % (ref 5–12)
NEUTROPHILS NFR BLD AUTO: 5.31 10*3/MM3 (ref 1.7–7)
NEUTROPHILS NFR BLD AUTO: 66.2 % (ref 42.7–76)
NRBC BLD AUTO-RTO: 0 /100 WBC (ref 0–0.2)
PLATELET # BLD AUTO: 306 10*3/MM3 (ref 140–450)
PMV BLD AUTO: 10.4 FL (ref 6–12)
POTASSIUM SERPL-SCNC: 3.8 MMOL/L (ref 3.5–5.2)
PROT SERPL-MCNC: 7.1 G/DL (ref 6–8.5)
RBC # BLD AUTO: 4.63 10*6/MM3 (ref 3.77–5.28)
SODIUM SERPL-SCNC: 141 MMOL/L (ref 136–145)
TRIGL SERPL-MCNC: 58 MG/DL (ref 0–150)
TSH SERPL DL<=0.05 MIU/L-ACNC: 1.36 UIU/ML (ref 0.27–4.2)
VLDLC SERPL-MCNC: 12 MG/DL (ref 5–40)
WBC NRBC COR # BLD: 8.03 10*3/MM3 (ref 3.4–10.8)

## 2023-01-10 PROCEDURE — 80050 GENERAL HEALTH PANEL: CPT | Performed by: FAMILY MEDICINE

## 2023-01-10 PROCEDURE — 80061 LIPID PANEL: CPT | Performed by: FAMILY MEDICINE

## 2023-01-10 PROCEDURE — 99396 PREV VISIT EST AGE 40-64: CPT | Performed by: FAMILY MEDICINE

## 2023-01-10 PROCEDURE — 36415 COLL VENOUS BLD VENIPUNCTURE: CPT | Performed by: FAMILY MEDICINE

## 2023-01-10 RX ORDER — LEVONORGESTREL AND ETHINYL ESTRADIOL 0.15-0.03
KIT ORAL
COMMUNITY
Start: 2023-01-08 | End: 2023-03-14 | Stop reason: SDUPTHER

## 2023-01-10 NOTE — ASSESSMENT & PLAN NOTE
Discussed healthy diet and  importance of regular exercise. Stressed importance of moderation in sodium/caffeine intake, cholesterol, caloric balance, sufficient intake of fresh fruits, vegetables, fiber, calcium and iron.

## 2023-01-10 NOTE — PROGRESS NOTES
Subjective   Sakina Langley is a 47 y.o. female.     History of Present Illness   The patient comes in today for annual physical and follow-up on ADD..  The patient is doing well denies anxiety, insomnia, trouble focusing fatigue, cold intolerance, headache, cough,chest pain, palpitations, dizziness and SOB. The patient admits to dietary compliance is  fair  and exercising occasionally.      The following portions of the patient's history were reviewed and updated as appropriate: past medical history, past social history, past surgical history and problem list.    Review of Systems   Constitutional: Negative for appetite change, fatigue and fever.   HENT: Negative for sore throat and trouble swallowing.    Respiratory: Negative for shortness of breath.    Cardiovascular: Negative for chest pain and palpitations.   Gastrointestinal: Negative for abdominal pain, nausea, vomiting and indigestion.   Endocrine: Negative for cold intolerance and polyphagia.   Neurological: Negative for headache.   Psychiatric/Behavioral: Negative for decreased concentration, sleep disturbance and depressed mood. The patient is not nervous/anxious.        Objective   Physical Exam  Vitals reviewed.   Constitutional:       Appearance: She is well-developed.   Neck:      Thyroid: No thyromegaly.   Cardiovascular:      Rate and Rhythm: Normal rate.      Heart sounds: Normal heart sounds.   Pulmonary:      Breath sounds: Normal breath sounds. No wheezing.   Abdominal:      Palpations: Abdomen is soft.      Tenderness: There is no abdominal tenderness.   Musculoskeletal:         General: Normal range of motion.      Cervical back: No tenderness.   Neurological:      Mental Status: She is alert and oriented to person, place, and time.   Psychiatric:         Mood and Affect: Mood normal.       Vitals:    01/10/23 1004   BP: 121/72   Pulse: 88   Resp: 16   Temp: 97.7 °F (36.5 °C)   SpO2: 98%     Body mass index is 23.91 kg/m².     Current  Outpatient Medications on File Prior to Visit   Medication Sig Dispense Refill   • amphetamine-dextroamphetamine (ADDERALL) 20 MG tablet Take 1 tablet by mouth 2 (Two) Times a Day. 60 tablet 0   • levonorgestrel-ethinyl estradiol (SEASONALE) 0.15-0.03 MG per tablet      • [DISCONTINUED] amphetamine-dextroamphetamine (ADDERALL) 20 MG tablet Take 1 tablet by mouth 2 (Two) Times a Day. 60 tablet 0     No current facility-administered medications on file prior to visit.           Assessment & Plan   Problems Addressed this Visit        Family History    Family history of colon cancer in mother    Relevant Orders    Ambulatory Referral For Screening Colonoscopy       Gastrointestinal Abdominal     Colon cancer screening    Relevant Orders    Ambulatory Referral For Screening Colonoscopy       Health Encounters    Encounter for well adult exam without abnormal findings - Primary     Discussed healthy diet and  importance of regular exercise. Stressed importance of moderation in sodium/caffeine intake, cholesterol, caloric balance, sufficient intake of fresh fruits, vegetables, fiber, calcium and iron.           Relevant Orders    Comprehensive metabolic panel    Lipid panel    CBC w AUTO Differential    TSH       Mental Health    ADHD     Stable on Adderall.        Diagnoses       Codes Comments    Encounter for well adult exam without abnormal findings    -  Primary ICD-10-CM: Z00.00  ICD-9-CM: V70.0     Colon cancer screening     ICD-10-CM: Z12.11  ICD-9-CM: V76.51     Family history of colon cancer in mother     ICD-10-CM: Z80.0  ICD-9-CM: V16.0     Attention deficit hyperactivity disorder (ADHD), unspecified ADHD type     ICD-10-CM: F90.9  ICD-9-CM: 314.01

## 2023-02-06 DIAGNOSIS — F90.9 ATTENTION DEFICIT HYPERACTIVITY DISORDER (ADHD), UNSPECIFIED ADHD TYPE: ICD-10-CM

## 2023-02-07 RX ORDER — DEXTROAMPHETAMINE SACCHARATE, AMPHETAMINE ASPARTATE, DEXTROAMPHETAMINE SULFATE AND AMPHETAMINE SULFATE 5; 5; 5; 5 MG/1; MG/1; MG/1; MG/1
20 TABLET ORAL 2 TIMES DAILY
Qty: 60 TABLET | Refills: 0 | Status: SHIPPED | OUTPATIENT
Start: 2023-02-07 | End: 2023-02-08 | Stop reason: SDUPTHER

## 2023-02-08 DIAGNOSIS — F90.9 ATTENTION DEFICIT HYPERACTIVITY DISORDER (ADHD), UNSPECIFIED ADHD TYPE: ICD-10-CM

## 2023-02-09 RX ORDER — DEXTROAMPHETAMINE SACCHARATE, AMPHETAMINE ASPARTATE, DEXTROAMPHETAMINE SULFATE AND AMPHETAMINE SULFATE 5; 5; 5; 5 MG/1; MG/1; MG/1; MG/1
20 TABLET ORAL 2 TIMES DAILY
Qty: 60 TABLET | Refills: 0 | Status: SHIPPED | OUTPATIENT
Start: 2023-02-09 | End: 2023-03-13 | Stop reason: SDUPTHER

## 2023-03-13 ENCOUNTER — TELEPHONE (OUTPATIENT)
Dept: OBSTETRICS AND GYNECOLOGY | Facility: CLINIC | Age: 48
End: 2023-03-13
Payer: COMMERCIAL

## 2023-03-13 DIAGNOSIS — F90.9 ATTENTION DEFICIT HYPERACTIVITY DISORDER (ADHD), UNSPECIFIED ADHD TYPE: ICD-10-CM

## 2023-03-13 RX ORDER — DEXTROAMPHETAMINE SACCHARATE, AMPHETAMINE ASPARTATE, DEXTROAMPHETAMINE SULFATE AND AMPHETAMINE SULFATE 5; 5; 5; 5 MG/1; MG/1; MG/1; MG/1
20 TABLET ORAL 2 TIMES DAILY
Qty: 60 TABLET | Refills: 0 | Status: SHIPPED | OUTPATIENT
Start: 2023-03-13

## 2023-03-13 NOTE — TELEPHONE ENCOUNTER
Patient is requesting refill on birth control  until AE with  on 08/04/2023.     Medication:  levonorgestrel-ethinyl estradiol (Introvale) 0.15-0.03 MG per       Pharmacy:  [email or web address] #[id number] - [place], [place] - [address] AT [facility] [address]  - [phone]  - [phone] FX      Please advise,  Thank you

## 2023-03-14 RX ORDER — LEVONORGESTREL AND ETHINYL ESTRADIOL 0.15-0.03
1 KIT ORAL DAILY
Qty: 84 TABLET | Refills: 1 | Status: SHIPPED | OUTPATIENT
Start: 2023-03-14

## 2023-04-15 DIAGNOSIS — F90.9 ATTENTION DEFICIT HYPERACTIVITY DISORDER (ADHD), UNSPECIFIED ADHD TYPE: ICD-10-CM

## 2023-04-17 RX ORDER — DEXTROAMPHETAMINE SACCHARATE, AMPHETAMINE ASPARTATE, DEXTROAMPHETAMINE SULFATE AND AMPHETAMINE SULFATE 5; 5; 5; 5 MG/1; MG/1; MG/1; MG/1
20 TABLET ORAL 2 TIMES DAILY
Qty: 60 TABLET | Refills: 0 | Status: SHIPPED | OUTPATIENT
Start: 2023-04-17

## 2023-05-19 DIAGNOSIS — F90.9 ATTENTION DEFICIT HYPERACTIVITY DISORDER (ADHD), UNSPECIFIED ADHD TYPE: ICD-10-CM

## 2023-05-22 RX ORDER — DEXTROAMPHETAMINE SACCHARATE, AMPHETAMINE ASPARTATE, DEXTROAMPHETAMINE SULFATE AND AMPHETAMINE SULFATE 5; 5; 5; 5 MG/1; MG/1; MG/1; MG/1
20 TABLET ORAL 2 TIMES DAILY
Qty: 60 TABLET | Refills: 0 | Status: SHIPPED | OUTPATIENT
Start: 2023-05-22

## 2023-07-26 DIAGNOSIS — F90.9 ATTENTION DEFICIT HYPERACTIVITY DISORDER (ADHD), UNSPECIFIED ADHD TYPE: ICD-10-CM

## 2023-07-27 RX ORDER — DEXTROAMPHETAMINE SACCHARATE, AMPHETAMINE ASPARTATE, DEXTROAMPHETAMINE SULFATE AND AMPHETAMINE SULFATE 5; 5; 5; 5 MG/1; MG/1; MG/1; MG/1
20 TABLET ORAL 2 TIMES DAILY
Qty: 60 TABLET | Refills: 0 | Status: SHIPPED | OUTPATIENT
Start: 2023-07-27

## 2023-08-01 ENCOUNTER — TELEPHONE (OUTPATIENT)
Dept: OBSTETRICS AND GYNECOLOGY | Facility: CLINIC | Age: 48
End: 2023-08-01
Payer: COMMERCIAL

## 2023-09-01 DIAGNOSIS — F90.9 ATTENTION DEFICIT HYPERACTIVITY DISORDER (ADHD), UNSPECIFIED ADHD TYPE: ICD-10-CM

## 2023-09-05 RX ORDER — DEXTROAMPHETAMINE SACCHARATE, AMPHETAMINE ASPARTATE, DEXTROAMPHETAMINE SULFATE AND AMPHETAMINE SULFATE 5; 5; 5; 5 MG/1; MG/1; MG/1; MG/1
20 TABLET ORAL 2 TIMES DAILY
Qty: 60 TABLET | Refills: 0 | Status: SHIPPED | OUTPATIENT
Start: 2023-09-05

## 2023-09-14 ENCOUNTER — TELEPHONE (OUTPATIENT)
Dept: OBSTETRICS AND GYNECOLOGY | Facility: CLINIC | Age: 48
End: 2023-09-14
Payer: COMMERCIAL

## 2023-09-25 ENCOUNTER — TELEPHONE (OUTPATIENT)
Dept: FAMILY MEDICINE CLINIC | Facility: CLINIC | Age: 48
End: 2023-09-25

## 2023-09-25 ENCOUNTER — TELEMEDICINE (OUTPATIENT)
Dept: FAMILY MEDICINE CLINIC | Facility: CLINIC | Age: 48
End: 2023-09-25

## 2023-09-25 DIAGNOSIS — F41.9 ANXIETY: ICD-10-CM

## 2023-09-25 DIAGNOSIS — F90.9 ATTENTION DEFICIT HYPERACTIVITY DISORDER (ADHD), UNSPECIFIED ADHD TYPE: Primary | ICD-10-CM

## 2023-09-25 PROCEDURE — 99213 OFFICE O/P EST LOW 20 MIN: CPT | Performed by: FAMILY MEDICINE

## 2023-09-25 RX ORDER — DEXTROAMPHETAMINE SACCHARATE, AMPHETAMINE ASPARTATE, DEXTROAMPHETAMINE SULFATE AND AMPHETAMINE SULFATE 2.5; 2.5; 2.5; 2.5 MG/1; MG/1; MG/1; MG/1
20 TABLET ORAL 2 TIMES DAILY
Qty: 120 TABLET | Refills: 0 | Status: SHIPPED | OUTPATIENT
Start: 2023-09-25

## 2023-09-25 NOTE — ASSESSMENT & PLAN NOTE
Symptoms are stable.    This is a video visit [employer] pantera was used to complete this visit.  Patient was at work and I was at [facility] office.  Total time of discussion was 20 minutes.

## 2023-09-25 NOTE — PROGRESS NOTES
Subjective   Sakina Langley is a 48 y.o. female.     History of Present Illness  You have chosen to receive care through a telehealth visit.  Do you consent to use a video/audio connection for your medical care today? Yes  Telehealth visit in view of COVID-19 pandemic.      The patient present  for follow up on ADD and med refill. She is out of Adderall for a week due to medication shortage and has noticed difficulity concentration, focusing and anxiety.  She denies depressed mood, trouble sleep, shortness of breath and chest pain.    The following portions of the patient's history were reviewed and updated as appropriate: past medical history, past social history, past surgical history and problem list.    Review of Systems   Cardiovascular:  Negative for chest pain and palpitations.   Psychiatric/Behavioral:  Positive for decreased concentration. Negative for agitation, behavioral problems and depressed mood. The patient is nervous/anxious.      Objective   Physical Exam  Neurological:      Mental Status: She is alert and oriented to person, place, and time.   Psychiatric:         Mood and Affect: Mood normal.     Current Outpatient Medications on File Prior to Visit   Medication Sig Dispense Refill    levonorgestrel-ethinyl estradiol (SEASONALE) 0.15-0.03 MG per tablet Take 1 tablet by mouth Daily. 84 tablet 1    [DISCONTINUED] amphetamine-dextroamphetamine (ADDERALL) 20 MG tablet Take 1 tablet by mouth 2 (Two) Times a Day. 60 tablet 0     No current facility-administered medications on file prior to visit.           Assessment & Plan   Problems Addressed this Visit          Mental Health    ADHD - Primary     Psychological condition is unchanged.  Continue current treatment regimen.  Regular aerobic exercise.  Psychological condition  will be reassessed in 3 months.         Relevant Medications    amphetamine-dextroamphetamine (Adderall) 10 MG tablet    Anxiety     Symptoms are stable.    This is a video visit  [employer] pantera was used to complete this visit.  Patient was at work and I was at [facility] office.  Total time of discussion was 20 minutes.          Diagnoses         Codes Comments    Attention deficit hyperactivity disorder (ADHD), unspecified ADHD type    -  Primary ICD-10-CM: F90.9  ICD-9-CM: 314.01     Anxiety     ICD-10-CM: F41.9  ICD-9-CM: 300.00

## 2023-09-25 NOTE — TELEPHONE ENCOUNTER
Caller: Sakina Lagnley    Relationship: Self    Best call back number: [phone]    What medication are you requesting: amphetamine-dextroamphetamine (ADDERALL) 10 MG tablet     If a prescription is needed, what is your preferred pharmacy and phone number: [facility] #[id number] - [place], [place] - [address] AT [facility] OF [address]  - [phone]  - [phone] FX     Additional notes: PATIENT STATED THAT [facility] IS OUT OF amphetamine-dextroamphetamine (ADDERALL) 20 MG tablet BUT THEY CURRENTLY HAVE amphetamine-dextroamphetamine (ADDERALL) 10 MG tablet IN STOCK    PATIENT STATED THAT PHARMACY TOLD HER TO SEND PRESCRIPTION REQUEST AS SOON AS POSSIBLE AS THEY ARE ALMOST OUT OF THE 10 MG AS WELL

## 2023-09-25 NOTE — TELEPHONE ENCOUNTER
Please Inform patient prescription has been sent to [facility] pharmacy, thanks

## 2023-09-25 NOTE — TELEPHONE ENCOUNTER
Patient notified via voicemail. Advised to call back if she has any questions or concerns on 9/25 @ 2:32PM.

## 2023-09-28 NOTE — TELEPHONE ENCOUNTER
Caller: Sakina Langley    Relationship: Self    Best call back number: [phone]*    What is the best time to reach you: ANYTIME    Who are you requesting to speak with (clinical staff, provider,  specific staff member): CLINICAL    What was the call regarding: PATIENT CALLING STATING THAT [facility] WILL NOT DISPENSE the amphetamine-dextroamphetamine (Adderall) 10 MG tablet, UNTIL A FORM THAT WAS SENT TO DR. KASPER, IS COMPLETED AND FAXED BACK. THE PATIENT STATES THAT THE PHARMACY TOLD HER DR. KASPER WILL NEED TO FILL OUT THE FORM AS TO WHY THE MEDICATION DOSAGE WAS CHANGED.    Is it okay if the provider responds through MyChart: NO

## 2023-09-28 NOTE — TELEPHONE ENCOUNTER
Have you received any from from pharmacy?  Medication dose was changed to Adderall 10 mg because of unavailability of Adderall 20 mg dosage. thanks

## 2023-09-29 NOTE — TELEPHONE ENCOUNTER
Please call and check with pharmacy, if they need verbal order okay to tell them the dose was changed because of unavailability of 20 mg strength of Adderall thats why the prescription was changed to 10 mg take 2 tablets twice daily, thanks

## 2023-09-29 NOTE — TELEPHONE ENCOUNTER
Sat on hold for 30 minutes with patients pharmacy as they didn't  and didn't get a voicemail, so will call again on Monday.

## 2023-11-09 ENCOUNTER — OFFICE VISIT (OUTPATIENT)
Dept: FAMILY MEDICINE CLINIC | Facility: CLINIC | Age: 48
End: 2023-11-09
Payer: COMMERCIAL

## 2023-11-09 VITALS
BODY MASS INDEX: 23.6 KG/M2 | HEIGHT: 63 IN | OXYGEN SATURATION: 97 % | HEART RATE: 88 BPM | WEIGHT: 133.2 LBS | DIASTOLIC BLOOD PRESSURE: 74 MMHG | SYSTOLIC BLOOD PRESSURE: 111 MMHG | TEMPERATURE: 97.3 F | RESPIRATION RATE: 16 BRPM

## 2023-11-09 DIAGNOSIS — F90.9 ATTENTION DEFICIT HYPERACTIVITY DISORDER (ADHD), UNSPECIFIED ADHD TYPE: ICD-10-CM

## 2023-11-09 PROCEDURE — 99213 OFFICE O/P EST LOW 20 MIN: CPT | Performed by: FAMILY MEDICINE

## 2023-11-09 RX ORDER — DEXTROAMPHETAMINE SACCHARATE, AMPHETAMINE ASPARTATE, DEXTROAMPHETAMINE SULFATE AND AMPHETAMINE SULFATE 5; 5; 5; 5 MG/1; MG/1; MG/1; MG/1
20 TABLET ORAL 2 TIMES DAILY
Qty: 60 TABLET | Refills: 0 | Status: SHIPPED | OUTPATIENT
Start: 2023-11-09

## 2023-11-09 NOTE — ASSESSMENT & PLAN NOTE
ADD symptoms are unchanged due to unavailability of medication.  Refill Adderall 20 mg twice daily.  Follow-up in 3 months.

## 2023-11-09 NOTE — PROGRESS NOTES
Subjective   Sakina Langley is a 48 y.o. female.     History of Present Illness     The patient present  for 3 months f/u on ADD and med refill.  She is having trouble refilling her medication last month due to medication shortage .She is complaining of difficulty concentration and focusing, anxiety, depression, and  insomnia.       The following portions of the patient's history were reviewed and updated as appropriate: past medical history, past social history, past surgical history and problem list.    Review of Systems   Cardiovascular:  Negative for palpitations.   Psychiatric/Behavioral:  Positive for decreased concentration, sleep disturbance and stress. The patient is nervous/anxious.        Objective   Physical Exam  Vitals reviewed.   Constitutional:       Appearance: She is well-developed.   Neck:      Thyroid: No thyromegaly.   Cardiovascular:      Heart sounds: Normal heart sounds.   Pulmonary:      Effort: Pulmonary effort is normal.      Breath sounds: Normal breath sounds.   Neurological:      Mental Status: She is alert and oriented to person, place, and time.   Psychiatric:         Mood and Affect: Mood normal.       Vitals:    11/09/23 0851   BP: 111/74   Pulse: 88   Resp: 16   Temp: 97.3 °F (36.3 °C)   SpO2: 97%     Current Outpatient Medications on File Prior to Visit   Medication Sig Dispense Refill    [DISCONTINUED] amphetamine-dextroamphetamine (Adderall) 10 MG tablet Take 2 tablets by mouth 2 (Two) Times a Day. 120 tablet 0    [DISCONTINUED] levonorgestrel-ethinyl estradiol (SEASONALE) 0.15-0.03 MG per tablet Take 1 tablet by mouth Daily. (Patient not taking: Reported on 11/9/2023) 84 tablet 1     No current facility-administered medications on file prior to visit.           Assessment & Plan   Problems Addressed this Visit       ADHD     ADD symptoms are unchanged due to unavailability of medication.  Refill Adderall 20 mg twice daily.  Follow-up in 3 months.         Relevant Medications     amphetamine-dextroamphetamine (Adderall) 20 MG tablet     Diagnoses         Codes Comments    Attention deficit hyperactivity disorder (ADHD), unspecified ADHD type     ICD-10-CM: F90.9  ICD-9-CM: 314.01

## 2023-12-13 DIAGNOSIS — F90.9 ATTENTION DEFICIT HYPERACTIVITY DISORDER (ADHD), UNSPECIFIED ADHD TYPE: ICD-10-CM

## 2023-12-13 RX ORDER — DEXTROAMPHETAMINE SACCHARATE, AMPHETAMINE ASPARTATE, DEXTROAMPHETAMINE SULFATE AND AMPHETAMINE SULFATE 5; 5; 5; 5 MG/1; MG/1; MG/1; MG/1
20 TABLET ORAL 2 TIMES DAILY
Qty: 60 TABLET | Refills: 0 | Status: SHIPPED | OUTPATIENT
Start: 2023-12-13

## 2024-01-16 DIAGNOSIS — F90.9 ATTENTION DEFICIT HYPERACTIVITY DISORDER (ADHD), UNSPECIFIED ADHD TYPE: ICD-10-CM

## 2024-01-16 RX ORDER — DEXTROAMPHETAMINE SACCHARATE, AMPHETAMINE ASPARTATE, DEXTROAMPHETAMINE SULFATE AND AMPHETAMINE SULFATE 5; 5; 5; 5 MG/1; MG/1; MG/1; MG/1
20 TABLET ORAL 2 TIMES DAILY
Qty: 60 TABLET | Refills: 0 | Status: SHIPPED | OUTPATIENT
Start: 2024-01-16

## 2024-02-20 DIAGNOSIS — F90.9 ATTENTION DEFICIT HYPERACTIVITY DISORDER (ADHD), UNSPECIFIED ADHD TYPE: ICD-10-CM

## 2024-02-20 RX ORDER — DEXTROAMPHETAMINE SACCHARATE, AMPHETAMINE ASPARTATE, DEXTROAMPHETAMINE SULFATE AND AMPHETAMINE SULFATE 5; 5; 5; 5 MG/1; MG/1; MG/1; MG/1
20 TABLET ORAL 2 TIMES DAILY
Qty: 60 TABLET | Refills: 0 | Status: SHIPPED | OUTPATIENT
Start: 2024-02-20

## 2024-04-05 ENCOUNTER — OFFICE VISIT (OUTPATIENT)
Dept: FAMILY MEDICINE CLINIC | Facility: CLINIC | Age: 49
End: 2024-04-05
Payer: COMMERCIAL

## 2024-04-05 VITALS
DIASTOLIC BLOOD PRESSURE: 87 MMHG | TEMPERATURE: 97.3 F | BODY MASS INDEX: 23.04 KG/M2 | HEART RATE: 79 BPM | WEIGHT: 130 LBS | OXYGEN SATURATION: 98 % | HEIGHT: 63 IN | SYSTOLIC BLOOD PRESSURE: 121 MMHG | RESPIRATION RATE: 16 BRPM

## 2024-04-05 DIAGNOSIS — Z12.31 SCREENING MAMMOGRAM FOR BREAST CANCER: ICD-10-CM

## 2024-04-05 DIAGNOSIS — Z00.00 ENCOUNTER FOR WELL ADULT EXAM WITHOUT ABNORMAL FINDINGS: Primary | ICD-10-CM

## 2024-04-05 DIAGNOSIS — F90.9 ATTENTION DEFICIT HYPERACTIVITY DISORDER (ADHD), UNSPECIFIED ADHD TYPE: ICD-10-CM

## 2024-04-05 DIAGNOSIS — Z12.11 COLON CANCER SCREENING: ICD-10-CM

## 2024-04-05 RX ORDER — DEXTROAMPHETAMINE SACCHARATE, AMPHETAMINE ASPARTATE, DEXTROAMPHETAMINE SULFATE AND AMPHETAMINE SULFATE 5; 5; 5; 5 MG/1; MG/1; MG/1; MG/1
20 TABLET ORAL 2 TIMES DAILY
Qty: 60 TABLET | Refills: 0 | Status: CANCELLED | OUTPATIENT
Start: 2024-04-05

## 2024-04-05 RX ORDER — DEXTROAMPHETAMINE SACCHARATE, AMPHETAMINE ASPARTATE, DEXTROAMPHETAMINE SULFATE AND AMPHETAMINE SULFATE 5; 5; 5; 5 MG/1; MG/1; MG/1; MG/1
20 TABLET ORAL 2 TIMES DAILY
Qty: 60 TABLET | Refills: 0 | Status: SHIPPED | OUTPATIENT
Start: 2024-04-05

## 2024-04-05 NOTE — PROGRESS NOTES
Subjective   Sakina Langley is a 48 y.o. female.     History of Present Illness     The patient comes in today for annual physical and follow-up on ADD.  The patient is doing well denies anxiety, depression, difficulty concentration, focusing, headache, cough, chest pain, palpitations, dizziness and SOB. The patient admits to dietary compliance is  fair  and exercising occasionally.  She is a non-smoker.  She is due for mammogram and colonoscopy.       The following portions of the patient's history were reviewed and updated as appropriate: past medical history, past social history, past surgical history and problem list.    Review of Systems   Constitutional:  Negative for activity change and appetite change.   Respiratory:  Negative for shortness of breath.    Cardiovascular:  Negative for chest pain and palpitations.   Neurological:  Negative for headache.   Psychiatric/Behavioral:  Negative for decreased concentration and sleep disturbance. The patient is not nervous/anxious.        Objective   Physical Exam  Vitals reviewed.   Constitutional:       Appearance: She is well-developed.   Neck:      Thyroid: No thyromegaly.   Cardiovascular:      Heart sounds: Normal heart sounds.   Pulmonary:      Effort: Pulmonary effort is normal.      Breath sounds: Normal breath sounds. No wheezing.   Abdominal:      Tenderness: There is no abdominal tenderness.   Neurological:      Mental Status: She is alert and oriented to person, place, and time.   Psychiatric:         Mood and Affect: Mood normal.         Vitals:    04/05/24 1058   BP: 121/87   Pulse: 79   Resp: 16   Temp: 97.3 °F (36.3 °C)   SpO2: 98%   Body mass index is 23.03 kg/m².    Current Outpatient Medications on File Prior to Visit   Medication Sig Dispense Refill    [DISCONTINUED] amphetamine-dextroamphetamine (Adderall) 20 MG tablet Take 1 tablet by mouth 2 (Two) Times a Day. 60 tablet 0     No current facility-administered medications on file prior to visit.          Assessment & Plan   Problems Addressed this Visit       ADHD     ADD symptoms are stable continue current dose of Adderall medication refilled.         Relevant Medications    amphetamine-dextroamphetamine (Adderall) 20 MG tablet    Encounter for well adult exam without abnormal findings - Primary     Discussed healthy diet and  importance of regular exercise. Stressed importance of moderation in sodium/caffeine intake,  cholesterol, caloric balance, sufficient intake of fresh fruits and vegetables.           Relevant Orders    Comprehensive metabolic panel    Lipid panel    CBC No Differential    Colon cancer screening    Relevant Orders    Ambulatory Referral For Screening Colonoscopy     Other Visit Diagnoses       Screening mammogram for breast cancer        Relevant Orders    Mammo Screening Digital Tomosynthesis Bilateral With CAD          Diagnoses         Codes Comments    Encounter for well adult exam without abnormal findings    -  Primary ICD-10-CM: Z00.00  ICD-9-CM: V70.0     Attention deficit hyperactivity disorder (ADHD), unspecified ADHD type     ICD-10-CM: F90.9  ICD-9-CM: 314.01     Screening mammogram for breast cancer     ICD-10-CM: Z12.31  ICD-9-CM: V76.12     Colon cancer screening     ICD-10-CM: Z12.11  ICD-9-CM: V76.51

## 2024-04-08 ENCOUNTER — TELEPHONE (OUTPATIENT)
Dept: GASTROENTEROLOGY | Facility: CLINIC | Age: 49
End: 2024-04-08
Payer: COMMERCIAL

## 2024-04-08 NOTE — TELEPHONE ENCOUNTER
Left voice message for patient to return call in reference to a referral we received from Nehemias Pickens for a colon cancer screening.

## 2024-04-12 NOTE — TELEPHONE ENCOUNTER
Attempted to contact patient for the second time, left a voicemail that we were trying to contact you in regards to the colonoscopy screening referral we had received. Please contact the office back to get this scheduled.

## 2024-05-01 ENCOUNTER — HOSPITAL ENCOUNTER (OUTPATIENT)
Dept: MAMMOGRAPHY | Facility: HOSPITAL | Age: 49
Discharge: HOME | End: 2024-05-01
Admitting: FAMILY MEDICINE
Payer: COMMERCIAL

## 2024-05-01 PROCEDURE — 77067 SCR MAMMO BI INCL CAD: CPT

## 2024-05-01 PROCEDURE — 77063 BREAST TOMOSYNTHESIS BI: CPT

## 2024-05-08 DIAGNOSIS — F90.9 ATTENTION DEFICIT HYPERACTIVITY DISORDER (ADHD), UNSPECIFIED ADHD TYPE: ICD-10-CM

## 2024-05-08 RX ORDER — DEXTROAMPHETAMINE SACCHARATE, AMPHETAMINE ASPARTATE, DEXTROAMPHETAMINE SULFATE AND AMPHETAMINE SULFATE 5; 5; 5; 5 MG/1; MG/1; MG/1; MG/1
20 TABLET ORAL 2 TIMES DAILY
Qty: 60 TABLET | Refills: 0 | Status: SHIPPED | OUTPATIENT
Start: 2024-05-08

## 2024-05-23 ENCOUNTER — TELEPHONE (OUTPATIENT)
Dept: OBSTETRICS AND GYNECOLOGY | Facility: CLINIC | Age: 49
End: 2024-05-23
Payer: COMMERCIAL

## 2024-05-23 NOTE — TELEPHONE ENCOUNTER
Please call patient to schedule pap smear follow up.  She had a colposcopy in 2022 and is due for a pap. Thanks!

## 2024-05-23 NOTE — TELEPHONE ENCOUNTER
Ok for hub/fo to relay    Lmtcb    Please call patient to schedule pap smear follow up.  She had a colposcopy in 2022 and is due for a pap. Thanks!

## 2024-06-07 DIAGNOSIS — F90.9 ATTENTION DEFICIT HYPERACTIVITY DISORDER (ADHD), UNSPECIFIED ADHD TYPE: ICD-10-CM

## 2024-06-07 RX ORDER — DEXTROAMPHETAMINE SACCHARATE, AMPHETAMINE ASPARTATE, DEXTROAMPHETAMINE SULFATE AND AMPHETAMINE SULFATE 5; 5; 5; 5 MG/1; MG/1; MG/1; MG/1
20 TABLET ORAL 2 TIMES DAILY
Qty: 60 TABLET | Refills: 0 | Status: SHIPPED | OUTPATIENT
Start: 2024-06-07

## 2024-07-13 DIAGNOSIS — F90.9 ATTENTION DEFICIT HYPERACTIVITY DISORDER (ADHD), UNSPECIFIED ADHD TYPE: ICD-10-CM

## 2024-07-16 RX ORDER — DEXTROAMPHETAMINE SACCHARATE, AMPHETAMINE ASPARTATE, DEXTROAMPHETAMINE SULFATE AND AMPHETAMINE SULFATE 5; 5; 5; 5 MG/1; MG/1; MG/1; MG/1
20 TABLET ORAL 2 TIMES DAILY
Qty: 60 TABLET | Refills: 0 | Status: SHIPPED | OUTPATIENT
Start: 2024-07-16

## 2024-07-18 ENCOUNTER — LAB (OUTPATIENT)
Dept: FAMILY MEDICINE CLINIC | Facility: CLINIC | Age: 49
End: 2024-07-18
Payer: COMMERCIAL

## 2024-07-18 ENCOUNTER — OFFICE VISIT (OUTPATIENT)
Dept: FAMILY MEDICINE CLINIC | Facility: CLINIC | Age: 49
End: 2024-07-18
Payer: COMMERCIAL

## 2024-07-18 VITALS
DIASTOLIC BLOOD PRESSURE: 75 MMHG | BODY MASS INDEX: 22.15 KG/M2 | OXYGEN SATURATION: 99 % | WEIGHT: 125 LBS | HEART RATE: 73 BPM | RESPIRATION RATE: 16 BRPM | HEIGHT: 63 IN | SYSTOLIC BLOOD PRESSURE: 110 MMHG | TEMPERATURE: 97.2 F

## 2024-07-18 DIAGNOSIS — F90.9 ATTENTION DEFICIT HYPERACTIVITY DISORDER (ADHD), UNSPECIFIED ADHD TYPE: Primary | ICD-10-CM

## 2024-07-18 DIAGNOSIS — Z00.00 ENCOUNTER FOR WELL ADULT EXAM WITHOUT ABNORMAL FINDINGS: ICD-10-CM

## 2024-07-18 LAB
ALBUMIN SERPL-MCNC: 4.4 G/DL (ref 3.5–5.2)
ALBUMIN/GLOB SERPL: 1.7 G/DL
ALP SERPL-CCNC: 76 U/L (ref 39–117)
ALT SERPL W P-5'-P-CCNC: 13 U/L (ref 1–33)
ANION GAP SERPL CALCULATED.3IONS-SCNC: 10 MMOL/L (ref 5–15)
AST SERPL-CCNC: 19 U/L (ref 1–32)
BILIRUB SERPL-MCNC: 0.4 MG/DL (ref 0–1.2)
BUN SERPL-MCNC: 15 MG/DL (ref 6–20)
BUN/CREAT SERPL: 25 (ref 7–25)
CALCIUM SPEC-SCNC: 9.8 MG/DL (ref 8.6–10.5)
CHLORIDE SERPL-SCNC: 103 MMOL/L (ref 98–107)
CHOLEST SERPL-MCNC: 179 MG/DL (ref 0–200)
CO2 SERPL-SCNC: 24 MMOL/L (ref 22–29)
CREAT SERPL-MCNC: 0.6 MG/DL (ref 0.57–1)
DEPRECATED RDW RBC AUTO: 40.2 FL (ref 37–54)
EGFRCR SERPLBLD CKD-EPI 2021: 112.3 ML/MIN/1.73
ERYTHROCYTE [DISTWIDTH] IN BLOOD BY AUTOMATED COUNT: 12.2 % (ref 12.3–15.4)
GLOBULIN UR ELPH-MCNC: 2.6 GM/DL
GLUCOSE SERPL-MCNC: 94 MG/DL (ref 65–99)
HCT VFR BLD AUTO: 42.1 % (ref 34–46.6)
HDLC SERPL-MCNC: 78 MG/DL (ref 40–60)
HGB BLD-MCNC: 14.1 G/DL (ref 12–15.9)
LDLC SERPL CALC-MCNC: 89 MG/DL (ref 0–100)
LDLC/HDLC SERPL: 1.14 {RATIO}
MCH RBC QN AUTO: 29.9 PG (ref 26.6–33)
MCHC RBC AUTO-ENTMCNC: 33.5 G/DL (ref 31.5–35.7)
MCV RBC AUTO: 89.4 FL (ref 79–97)
PLATELET # BLD AUTO: 256 10*3/MM3 (ref 140–450)
PMV BLD AUTO: 11.2 FL (ref 6–12)
POTASSIUM SERPL-SCNC: 4.2 MMOL/L (ref 3.5–5.2)
PROT SERPL-MCNC: 7 G/DL (ref 6–8.5)
RBC # BLD AUTO: 4.71 10*6/MM3 (ref 3.77–5.28)
SODIUM SERPL-SCNC: 137 MMOL/L (ref 136–145)
TRIGL SERPL-MCNC: 61 MG/DL (ref 0–150)
VLDLC SERPL-MCNC: 12 MG/DL (ref 5–40)
WBC NRBC COR # BLD AUTO: 6.69 10*3/MM3 (ref 3.4–10.8)

## 2024-07-18 PROCEDURE — 36415 COLL VENOUS BLD VENIPUNCTURE: CPT

## 2024-07-18 PROCEDURE — 80053 COMPREHEN METABOLIC PANEL: CPT | Performed by: FAMILY MEDICINE

## 2024-07-18 PROCEDURE — 99213 OFFICE O/P EST LOW 20 MIN: CPT | Performed by: FAMILY MEDICINE

## 2024-07-18 PROCEDURE — 85027 COMPLETE CBC AUTOMATED: CPT | Performed by: FAMILY MEDICINE

## 2024-07-18 PROCEDURE — 80061 LIPID PANEL: CPT | Performed by: FAMILY MEDICINE

## 2024-07-18 NOTE — ASSESSMENT & PLAN NOTE
Psychological condition is improving with treatment.  Continue current dose of Adderall.  Continue current treatment regimen.  Psychological condition  will be reassessed in 3 months.

## 2024-07-18 NOTE — PROGRESS NOTES
Subjective   Sakina Langley is a 48 y.o. female.     History of Present Illness   The patient present  for 3 months f/u on ADD and med refill. Patient has noticed improvement in symptoms. She is complaining of stress but denies difficulity concentration, focusing, anxiety, depression and  insomnia.  she is taking medication as prescribed and tolerating well.       The following portions of the patient's history were reviewed and updated as appropriate: past medical history, past social history, past surgical history and problem list.    Review of Systems   Cardiovascular:  Negative for chest pain and palpitations.   Psychiatric/Behavioral:  Positive for stress. Negative for agitation, decreased concentration, sleep disturbance and depressed mood. The patient is not nervous/anxious.        Objective   Physical Exam  Vitals reviewed.   Neurological:      Mental Status: She is alert and oriented to person, place, and time.   Psychiatric:         Mood and Affect: Mood normal.         Vitals:    07/18/24 1032   BP: 110/75   Pulse: 73   Resp: 16   Temp: 97.2 °F (36.2 °C)   SpO2: 99%     Current Outpatient Medications on File Prior to Visit   Medication Sig Dispense Refill    amphetamine-dextroamphetamine (Adderall) 20 MG tablet Take 1 tablet by mouth 2 (Two) Times a Day. 60 tablet 0     No current facility-administered medications on file prior to visit.         Assessment & Plan   Problems Addressed this Visit       ADHD - Primary     Psychological condition is improving with treatment.  Continue current dose of Adderall.  Continue current treatment regimen.  Psychological condition  will be reassessed in 3 months.          Diagnoses         Codes Comments    Attention deficit hyperactivity disorder (ADHD), unspecified ADHD type    -  Primary ICD-10-CM: F90.9  ICD-9-CM: 314.01

## 2024-07-22 NOTE — PROGRESS NOTES
Patient notified via voicemail. Advised to call back if she has any questions or concerns on 7/22 @ 3:37PM.

## 2024-08-16 DIAGNOSIS — F90.9 ATTENTION DEFICIT HYPERACTIVITY DISORDER (ADHD), UNSPECIFIED ADHD TYPE: ICD-10-CM

## 2024-08-16 RX ORDER — DEXTROAMPHETAMINE SACCHARATE, AMPHETAMINE ASPARTATE, DEXTROAMPHETAMINE SULFATE AND AMPHETAMINE SULFATE 5; 5; 5; 5 MG/1; MG/1; MG/1; MG/1
20 TABLET ORAL 2 TIMES DAILY
Qty: 60 TABLET | Refills: 0 | Status: SHIPPED | OUTPATIENT
Start: 2024-08-16

## 2024-09-19 DIAGNOSIS — F90.9 ATTENTION DEFICIT HYPERACTIVITY DISORDER (ADHD), UNSPECIFIED ADHD TYPE: ICD-10-CM

## 2024-09-23 RX ORDER — DEXTROAMPHETAMINE SACCHARATE, AMPHETAMINE ASPARTATE, DEXTROAMPHETAMINE SULFATE AND AMPHETAMINE SULFATE 5; 5; 5; 5 MG/1; MG/1; MG/1; MG/1
20 TABLET ORAL 2 TIMES DAILY
Qty: 60 TABLET | Refills: 0 | Status: SHIPPED | OUTPATIENT
Start: 2024-09-23

## 2024-10-23 DIAGNOSIS — F90.9 ATTENTION DEFICIT HYPERACTIVITY DISORDER (ADHD), UNSPECIFIED ADHD TYPE: ICD-10-CM

## 2024-10-25 RX ORDER — DEXTROAMPHETAMINE SACCHARATE, AMPHETAMINE ASPARTATE, DEXTROAMPHETAMINE SULFATE AND AMPHETAMINE SULFATE 5; 5; 5; 5 MG/1; MG/1; MG/1; MG/1
20 TABLET ORAL 2 TIMES DAILY
Qty: 60 TABLET | Refills: 0 | Status: SHIPPED | OUTPATIENT
Start: 2024-10-25

## 2024-12-18 DIAGNOSIS — F90.9 ATTENTION DEFICIT HYPERACTIVITY DISORDER (ADHD), UNSPECIFIED ADHD TYPE: ICD-10-CM

## 2024-12-18 RX ORDER — DEXTROAMPHETAMINE SACCHARATE, AMPHETAMINE ASPARTATE, DEXTROAMPHETAMINE SULFATE AND AMPHETAMINE SULFATE 5; 5; 5; 5 MG/1; MG/1; MG/1; MG/1
20 TABLET ORAL 2 TIMES DAILY
Qty: 60 TABLET | Refills: 0 | Status: SHIPPED | OUTPATIENT
Start: 2024-12-18

## 2024-12-18 NOTE — TELEPHONE ENCOUNTER
Caller: Sakina Langley    Relationship: Self    Best call back number: [phone]     Requested Prescriptions:   Requested Prescriptions     Pending Prescriptions Disp Refills    amphetamine-dextroamphetamine (Adderall) 20 MG tablet 60 tablet 0     Sig: Take 1 tablet by mouth 2 (Two) Times a Day.        Pharmacy where request should be sent: [facility] #[id number] - [facility] [place] - [address] AT [facility] OF [address] [facility] - [phone] [facility] [phone] FX     Last office visit with prescribing clinician: 7/18/2024   Last telemedicine visit with prescribing clinician: Visit date not found   Next office visit with prescribing clinician: 1/2/2025     Additional details provided by patient:      Does the patient have less than a 3 day supply:  [] Yes  [x] No    Would you like a call back once the refill request has been completed: [] Yes [] No    If the office needs to give you a call back, can they leave a voicemail: [] Yes [] No    April Ab Mei Rep   12/18/24 15:30 EST

## 2025-01-02 ENCOUNTER — OFFICE VISIT (OUTPATIENT)
Dept: FAMILY MEDICINE CLINIC | Facility: CLINIC | Age: 50
End: 2025-01-02
Payer: COMMERCIAL

## 2025-01-02 VITALS
OXYGEN SATURATION: 98 % | DIASTOLIC BLOOD PRESSURE: 78 MMHG | HEIGHT: 63 IN | WEIGHT: 124.3 LBS | TEMPERATURE: 97.2 F | SYSTOLIC BLOOD PRESSURE: 113 MMHG | HEART RATE: 78 BPM | RESPIRATION RATE: 18 BRPM | BODY MASS INDEX: 22.02 KG/M2

## 2025-01-02 DIAGNOSIS — F90.0 ATTENTION DEFICIT HYPERACTIVITY DISORDER (ADHD), PREDOMINANTLY INATTENTIVE TYPE: Primary | ICD-10-CM

## 2025-01-02 DIAGNOSIS — Z23 NEED FOR INFLUENZA VACCINATION: ICD-10-CM

## 2025-01-02 PROCEDURE — 99213 OFFICE O/P EST LOW 20 MIN: CPT | Performed by: FAMILY MEDICINE

## 2025-01-02 PROCEDURE — 90471 IMMUNIZATION ADMIN: CPT | Performed by: FAMILY MEDICINE

## 2025-01-02 PROCEDURE — 90656 IIV3 VACC NO PRSV 0.5 ML IM: CPT | Performed by: FAMILY MEDICINE

## 2025-01-02 NOTE — PROGRESS NOTES
Injection  Injection performed in LEFT DELTOID by Garland Vasquez MA. Patient tolerated the procedure well without complications.    Patient Supplied: no    FLUZONE   NDC: 04251-921-93  LOT: [id number]  EXP: 6/30/2025 01/02/25   Garland Vasquez MA

## 2025-01-02 NOTE — PROGRESS NOTES
Subjective   Sakina Langley is a 49 y.o. female.     History of Present Illness     History of Present Illness  The patient is a 49-year-old female who presents for follow-up on ADD. She reports that her ADD symptoms have been well-managed with Adderall. She does not experience any difficulty concentration, focusing, anxiety or depression. She has sleep disturbances, specifically difficulty maintaining sleep, although she has no issues with sleep onset.  She desire to get the flu shot today.    The following portions of the patient's history were reviewed and updated as appropriate: past medical history, past social history, past surgical history and problem list.    Review of Systems   Cardiovascular:  Negative for chest pain and palpitations.   Psychiatric/Behavioral:  Positive for sleep disturbance. Negative for decreased concentration and depressed mood. The patient is not nervous/anxious.          Objective   Physical Exam  Vitals reviewed.   Constitutional:       Appearance: She is well-developed.   Neck:      Thyroid: No thyromegaly.   Cardiovascular:      Heart sounds: Normal heart sounds.   Pulmonary:      Effort: Pulmonary effort is normal.      Breath sounds: Normal breath sounds.   Neurological:      Mental Status: She is alert and oriented to person, place, and time.   Psychiatric:         Mood and Affect: Mood normal.         Vitals:    01/02/25 1126   BP: 113/78   Pulse: 78   Resp: 18   Temp: 97.2 °F (36.2 °C)   SpO2: 98%     Current Outpatient Medications on File Prior to Visit   Medication Sig Dispense Refill    amphetamine-dextroamphetamine (Adderall) 20 MG tablet Take 1 tablet by mouth 2 (Two) Times a Day. 60 tablet 0     No current facility-administered medications on file prior to visit.         Assessment & Plan   Problems Addressed this Visit       ADHD - Primary     Symptoms are stable continue Adderall.         Need for influenza vaccination    Relevant Orders    Fluzone >6mos (Completed)      Diagnoses         Codes Comments    Attention deficit hyperactivity disorder (ADHD), predominantly inattentive type    -  Primary ICD-10-CM: F90.0  ICD-9-CM: 314.00     Need for influenza vaccination     ICD-10-CM: Z23  ICD-9-CM: V04.81             Assessment & Plan  1. Attention Deficit Disorder (ADD).  Her ADD symptoms are well-managed with the current dose of Adderall. A prescription refill for Adderall will be provided when it is due on 01/18/2025.  Flu shot given today.    2. Insomnia.  She reports difficulty staying asleep but has no trouble falling asleep.  Discussed sleep hygiene and advise melatonin.               Patient or patient representative verbalized consent for the use of Ambient Listening during the visit with  Nehemias Pickens MD for chart documentation. 1/2/2025  11:34 EST

## 2025-01-23 DIAGNOSIS — F90.9 ATTENTION DEFICIT HYPERACTIVITY DISORDER (ADHD), UNSPECIFIED ADHD TYPE: ICD-10-CM

## 2025-01-26 RX ORDER — DEXTROAMPHETAMINE SACCHARATE, AMPHETAMINE ASPARTATE, DEXTROAMPHETAMINE SULFATE AND AMPHETAMINE SULFATE 5; 5; 5; 5 MG/1; MG/1; MG/1; MG/1
20 TABLET ORAL 2 TIMES DAILY
Qty: 60 TABLET | Refills: 0 | Status: SHIPPED | OUTPATIENT
Start: 2025-01-26

## 2025-02-28 DIAGNOSIS — F90.9 ATTENTION DEFICIT HYPERACTIVITY DISORDER (ADHD), UNSPECIFIED ADHD TYPE: ICD-10-CM

## 2025-03-02 RX ORDER — DEXTROAMPHETAMINE SACCHARATE, AMPHETAMINE ASPARTATE, DEXTROAMPHETAMINE SULFATE AND AMPHETAMINE SULFATE 5; 5; 5; 5 MG/1; MG/1; MG/1; MG/1
20 TABLET ORAL 2 TIMES DAILY
Qty: 60 TABLET | Refills: 0 | Status: SHIPPED | OUTPATIENT
Start: 2025-03-02

## 2025-04-24 DIAGNOSIS — F90.9 ATTENTION DEFICIT HYPERACTIVITY DISORDER (ADHD), UNSPECIFIED ADHD TYPE: ICD-10-CM

## 2025-04-24 RX ORDER — DEXTROAMPHETAMINE SACCHARATE, AMPHETAMINE ASPARTATE, DEXTROAMPHETAMINE SULFATE AND AMPHETAMINE SULFATE 5; 5; 5; 5 MG/1; MG/1; MG/1; MG/1
20 TABLET ORAL 2 TIMES DAILY
Qty: 60 TABLET | Refills: 0 | Status: SHIPPED | OUTPATIENT
Start: 2025-04-24

## 2025-04-24 NOTE — TELEPHONE ENCOUNTER
DELETE AFTER REVIEWING: Send the e    Caller: Sakina Langley    Relationship: Self    Best call back number: [phone]     Requested Prescriptions:   Requested Prescriptions     Pending Prescriptions Disp Refills    amphetamine-dextroamphetamine (Adderall) 20 MG tablet 60 tablet 0     Sig: Take 1 tablet by mouth 2 (Two) Times a Day.        Pharmacy where request should be sent: [id number] - [place], [place] - [address] AT [facility] [address] [facility] - [phone]  - [phone] FX     Last office visit with prescribing clinician: 1/2/2025   Last telemedicine visit with prescribing clinician: Visit date not found   Next office visit with prescribing clinician: 5/8/2025     Additional details provided by patient: PATIENT IS OUT OF MEDICINE    Does the patient have less than a 3 day supply:  [x] Yes  [] No      Ab Cabezas Rep   04/24/25 12:34 EDT

## 2025-05-08 ENCOUNTER — OFFICE VISIT (OUTPATIENT)
Dept: FAMILY MEDICINE CLINIC | Facility: CLINIC | Age: 50
End: 2025-05-08
Payer: COMMERCIAL

## 2025-05-08 VITALS
RESPIRATION RATE: 16 BRPM | HEIGHT: 63 IN | SYSTOLIC BLOOD PRESSURE: 109 MMHG | OXYGEN SATURATION: 98 % | BODY MASS INDEX: 21.32 KG/M2 | TEMPERATURE: 97.2 F | WEIGHT: 120.3 LBS | HEART RATE: 82 BPM | DIASTOLIC BLOOD PRESSURE: 75 MMHG

## 2025-05-08 DIAGNOSIS — Z12.31 SCREENING MAMMOGRAM FOR BREAST CANCER: ICD-10-CM

## 2025-05-08 DIAGNOSIS — Z12.11 COLON CANCER SCREENING: ICD-10-CM

## 2025-05-08 DIAGNOSIS — F90.0 ATTENTION DEFICIT HYPERACTIVITY DISORDER (ADHD), PREDOMINANTLY INATTENTIVE TYPE: ICD-10-CM

## 2025-05-08 DIAGNOSIS — Z00.00 ENCOUNTER FOR WELL ADULT EXAM WITHOUT ABNORMAL FINDINGS: Primary | ICD-10-CM

## 2025-05-08 DIAGNOSIS — L23.7 CONTACT DERMATITIS DUE TO POISON IVY: ICD-10-CM

## 2025-05-08 RX ORDER — PREDNISONE 10 MG/1
10 TABLET ORAL 2 TIMES DAILY
COMMUNITY
Start: 2025-04-28

## 2025-05-08 NOTE — PROGRESS NOTES
Subjective   Sakina Langley is a 49 y.o. female.     History of Present Illness     History of Present Illness  The patient is a 49-year-old female who presents for an annual physical exam and follow-up on ADD.  Two weeks ago, she developed a rash after working in the yard and visited an urgent care facility where she was diagnosed with poison ivy dermatitis and prescribed prednisone. She is currently in her third week of treatment. She reports that the medication has been helpful, although it has affected her sleep. She denies experiencing any respiratory symptoms such as shortness of breath, sore throat, wheezing, or coughing.    She reports having undergone a bone graft for a tooth implant, which caused significant discomfort and affected her eating habits. She was on a softer diet for a week following the procedure.    She has been adhering to her prescribed regimen of Adderall, taking two doses daily, and reports no difficulties with focus or concentration.        The following portions of the patient's history were reviewed and updated as appropriate: past medical history, past social history, past surgical history and problem list.    Review of Systems   HENT:  Negative for facial swelling, sore throat and trouble swallowing.    Respiratory:  Negative for shortness of breath and wheezing.    Cardiovascular:  Negative for chest pain and palpitations.   Gastrointestinal:  Negative for abdominal pain, nausea and vomiting.   Skin:  Positive for rash.   Neurological:  Negative for headache.   Psychiatric/Behavioral:  Negative for behavioral problems, decreased concentration, sleep disturbance and depressed mood. The patient is not nervous/anxious.          Objective   Physical Exam  Vitals reviewed.   Constitutional:       General: She is not in acute distress.     Appearance: She is well-developed.   HENT:      Mouth/Throat:      Mouth: Mucous membranes are moist.      Pharynx: No posterior oropharyngeal  erythema.   Neck:      Thyroid: No thyromegaly.   Cardiovascular:      Heart sounds: Normal heart sounds.   Pulmonary:      Effort: Pulmonary effort is normal.      Breath sounds: Normal breath sounds. No wheezing.   Abdominal:      Tenderness: There is no abdominal tenderness.   Neurological:      Mental Status: She is alert and oriented to person, place, and time.   Psychiatric:         Mood and Affect: Mood normal.         Vitals:    05/08/25 1101   BP: 109/75   Pulse: 82   Resp: 16   Temp: 97.2 °F (36.2 °C)   SpO2: 98%     Current Outpatient Medications on File Prior to Visit   Medication Sig Dispense Refill    amphetamine-dextroamphetamine (Adderall) 20 MG tablet Take 1 tablet by mouth 2 (Two) Times a Day. 60 tablet 0    predniSONE (DELTASONE) 10 MG tablet Take 1 tablet by mouth 2 (Two) Times a Day.       No current facility-administered medications on file prior to visit.         Assessment & Plan   Problems Addressed this Visit       ADHD    Encounter for well adult exam without abnormal findings - Primary    Relevant Orders    TSH    Comprehensive Metabolic Panel    Lipid Panel    CBC (No Diff)    Colon cancer screening    Relevant Orders    Ambulatory Referral For Screening Colonoscopy    Screening mammogram for breast cancer    Relevant Orders    Mammo Screening Digital Tomosynthesis Bilateral With CAD    Contact dermatitis due to poison ivy     Diagnoses         Codes Comments      Encounter for well adult exam without abnormal findings    -  Primary ICD-10-CM: Z00.00  ICD-9-CM: V70.0       Attention deficit hyperactivity disorder (ADHD), predominantly inattentive type     ICD-10-CM: F90.0  ICD-9-CM: 314.00       Screening mammogram for breast cancer     ICD-10-CM: Z12.31  ICD-9-CM: V76.12       Colon cancer screening     ICD-10-CM: Z12.11  ICD-9-CM: V76.51       Contact dermatitis due to poison ivy     ICD-10-CM: L23.7  ICD-9-CM: 692.6             Assessment & Plan  1. Annual physical examination.  -  Discussed healthy diet and  importance of regular exercise. Discussed sufficient intake of fresh fruits and vegetables.  - Mammogram and screening colonoscopy ordered; patient will schedule accordingly.    2. Attention deficit disorder (ADD).  - Stable- Continues to take Adderall 10 mg twice daily without reported issues with focus or concentration.      3. Rash.  - Seen at urgent care -symptoms are improving currently on prednisone 10 mg twice daily for 1 more week to finish the course.           Patient or patient representative verbalized consent for the use of Ambient Listening during the visit with  Nehemias Pickens MD for chart documentation. 5/8/2025  11:07 EDT

## 2025-05-27 DIAGNOSIS — F90.9 ATTENTION DEFICIT HYPERACTIVITY DISORDER (ADHD), UNSPECIFIED ADHD TYPE: ICD-10-CM

## 2025-05-27 RX ORDER — DEXTROAMPHETAMINE SACCHARATE, AMPHETAMINE ASPARTATE, DEXTROAMPHETAMINE SULFATE AND AMPHETAMINE SULFATE 5; 5; 5; 5 MG/1; MG/1; MG/1; MG/1
20 TABLET ORAL 2 TIMES DAILY
Qty: 60 TABLET | Refills: 0 | Status: SHIPPED | OUTPATIENT
Start: 2025-05-27

## 2025-05-27 NOTE — TELEPHONE ENCOUNTER
Rx Refill Note  Requested Prescriptions     Pending Prescriptions Disp Refills    amphetamine-dextroamphetamine (Adderall) 20 MG tablet 60 tablet 0     Sig: Take 1 tablet by mouth 2 (Two) Times a Day.      Last office visit with prescribing clinician: 5/8/2025   Last telemedicine visit with prescribing clinician: Visit date not found   Next office visit with prescribing clinician: Visit date not found                         Would you like a call back once the refill request has been completed: [] Yes [] No    If the office needs to give you a call back, can they leave a voicemail: [] Yes [] No    Rosa Isela Early MA  05/27/25, 11:51 EDT

## 2025-06-30 DIAGNOSIS — F90.9 ATTENTION DEFICIT HYPERACTIVITY DISORDER (ADHD), UNSPECIFIED ADHD TYPE: ICD-10-CM

## 2025-07-01 RX ORDER — DEXTROAMPHETAMINE SACCHARATE, AMPHETAMINE ASPARTATE, DEXTROAMPHETAMINE SULFATE AND AMPHETAMINE SULFATE 5; 5; 5; 5 MG/1; MG/1; MG/1; MG/1
20 TABLET ORAL 2 TIMES DAILY
Qty: 60 TABLET | Refills: 0 | Status: SHIPPED | OUTPATIENT
Start: 2025-07-01

## 2025-08-07 DIAGNOSIS — F90.9 ATTENTION DEFICIT HYPERACTIVITY DISORDER (ADHD), UNSPECIFIED ADHD TYPE: ICD-10-CM

## 2025-08-08 RX ORDER — DEXTROAMPHETAMINE SACCHARATE, AMPHETAMINE ASPARTATE, DEXTROAMPHETAMINE SULFATE AND AMPHETAMINE SULFATE 5; 5; 5; 5 MG/1; MG/1; MG/1; MG/1
20 TABLET ORAL 2 TIMES DAILY
Qty: 60 TABLET | Refills: 0 | Status: SHIPPED | OUTPATIENT
Start: 2025-08-08

## (undated) DEVICE — SOL IRRIG H2O 1000ML STRL

## (undated) DEVICE — BNDG ADHS PLSTC 1X3IN LF

## (undated) DEVICE — ENDOPATH 5 MM GRASPERS WITH RATCHET HANDLES: Brand: ENDOPATH

## (undated) DEVICE — ENDOPATH XCEL WITH OPTIVIEW TECHNOLOGY UNIVERSAL TROCAR STABILITY SLEEVES: Brand: ENDOPATH XCEL OPTIVIEW

## (undated) DEVICE — LAPAROSCOPIC GAS CONDITIONING DEVICE.: Brand: INSUFLOW

## (undated) DEVICE — ENDOPOUCH RETRIEVER SPECIMEN RETRIEVAL BAGS: Brand: ENDOPOUCH RETRIEVER

## (undated) DEVICE — ENDOPATH 5MM CURVED SCISSORS WITH MONOPOLAR CAUTERY: Brand: ENDOPATH

## (undated) DEVICE — UNDYED BRAIDED (POLYGLACTIN 910), SYNTHETIC ABSORBABLE SUTURE: Brand: COATED VICRYL

## (undated) DEVICE — 2, DISPOSABLE SUCTION/IRRIGATOR WITH DISPOSABLE TIP: Brand: STRYKEFLOW

## (undated) DEVICE — SOL NACL 0.9PCT 1000ML

## (undated) DEVICE — ENDOPATH XCEL WITH OPTIVIEW TECHNOLOGY BLADELESS TROCARS WITH STABILITY SLEEVES: Brand: ENDOPATH XCEL OPTIVIEW

## (undated) DEVICE — SUT VIC 0/0 UR6 27IN DYED J603H

## (undated) DEVICE — UNDERGLV SURG BIOGEL INDICAT PF 61/2 GRN

## (undated) DEVICE — GENERAL LAPAROSCOPY CDS: Brand: MEDLINE INDUSTRIES, INC.

## (undated) DEVICE — PROXIMATE RH ROTATING HEAD SKIN STAPLERS (35 WIDE) CONTAINS 35 STAINLESS STEEL STAPLES: Brand: PROXIMATE

## (undated) DEVICE — CUFF SCD HEMOFORCE SEQ CALF STD MD

## (undated) DEVICE — UNDERGLV SURG BIOGEL INDICATOR LTX PF 7

## (undated) DEVICE — PK PROC TURNOVER

## (undated) DEVICE — GLV SURG BIOGEL SENSR LTX PF SZ6.5

## (undated) DEVICE — ENDOPATH XCEL BLUNT TIP TROCARS WITH SMOOTH SLEEVES: Brand: ENDOPATH XCEL

## (undated) DEVICE — 3M™ PATIENT PLATE, CORDED, SPLIT, LARGE, 40 PER CASE, 1179: Brand: 3M™